# Patient Record
Sex: FEMALE | Race: WHITE | NOT HISPANIC OR LATINO | Employment: UNEMPLOYED | URBAN - METROPOLITAN AREA
[De-identification: names, ages, dates, MRNs, and addresses within clinical notes are randomized per-mention and may not be internally consistent; named-entity substitution may affect disease eponyms.]

---

## 2017-01-18 ENCOUNTER — OFFICE VISIT (OUTPATIENT)
Dept: FAMILY MEDICINE | Facility: CLINIC | Age: 27
End: 2017-01-18
Payer: COMMERCIAL

## 2017-01-18 VITALS
SYSTOLIC BLOOD PRESSURE: 122 MMHG | DIASTOLIC BLOOD PRESSURE: 78 MMHG | BODY MASS INDEX: 19.48 KG/M2 | HEART RATE: 80 BPM | WEIGHT: 124.4 LBS | TEMPERATURE: 97.9 F

## 2017-01-18 DIAGNOSIS — T36.95XA ANTIBIOTIC-INDUCED YEAST INFECTION: ICD-10-CM

## 2017-01-18 DIAGNOSIS — B96.89 BACTERIAL VAGINOSIS: Primary | ICD-10-CM

## 2017-01-18 DIAGNOSIS — R39.89 SENSATION OF PRESSURE IN BLADDER AREA: ICD-10-CM

## 2017-01-18 DIAGNOSIS — N76.0 BACTERIAL VAGINOSIS: Primary | ICD-10-CM

## 2017-01-18 DIAGNOSIS — Z11.3 SCREEN FOR STD (SEXUALLY TRANSMITTED DISEASE): ICD-10-CM

## 2017-01-18 DIAGNOSIS — B37.9 ANTIBIOTIC-INDUCED YEAST INFECTION: ICD-10-CM

## 2017-01-18 LAB
ALBUMIN UR-MCNC: NEGATIVE MG/DL
APPEARANCE UR: CLEAR
BETA HCG QUAL IFA URINE: NEGATIVE
BILIRUB UR QL STRIP: NEGATIVE
COLOR UR AUTO: YELLOW
GLUCOSE UR STRIP-MCNC: NEGATIVE MG/DL
HGB UR QL STRIP: NEGATIVE
KETONES UR STRIP-MCNC: NEGATIVE MG/DL
LEUKOCYTE ESTERASE UR QL STRIP: NEGATIVE
MICRO REPORT STATUS: ABNORMAL
NITRATE UR QL: NEGATIVE
PH UR STRIP: 6 PH (ref 5–7)
SP GR UR STRIP: 1.01 (ref 1–1.03)
SPECIMEN SOURCE: ABNORMAL
URN SPEC COLLECT METH UR: NORMAL
UROBILINOGEN UR STRIP-ACNC: 0.2 EU/DL (ref 0.2–1)
WET PREP SPEC: ABNORMAL

## 2017-01-18 PROCEDURE — 81003 URINALYSIS AUTO W/O SCOPE: CPT | Performed by: NURSE PRACTITIONER

## 2017-01-18 PROCEDURE — 87491 CHLMYD TRACH DNA AMP PROBE: CPT | Performed by: NURSE PRACTITIONER

## 2017-01-18 PROCEDURE — 87591 N.GONORRHOEAE DNA AMP PROB: CPT | Performed by: NURSE PRACTITIONER

## 2017-01-18 PROCEDURE — 36415 COLL VENOUS BLD VENIPUNCTURE: CPT | Performed by: NURSE PRACTITIONER

## 2017-01-18 PROCEDURE — 87389 HIV-1 AG W/HIV-1&-2 AB AG IA: CPT | Performed by: NURSE PRACTITIONER

## 2017-01-18 PROCEDURE — 84703 CHORIONIC GONADOTROPIN ASSAY: CPT | Performed by: NURSE PRACTITIONER

## 2017-01-18 PROCEDURE — 87210 SMEAR WET MOUNT SALINE/INK: CPT | Performed by: NURSE PRACTITIONER

## 2017-01-18 PROCEDURE — 99214 OFFICE O/P EST MOD 30 MIN: CPT | Performed by: NURSE PRACTITIONER

## 2017-01-18 RX ORDER — FLUCONAZOLE 150 MG/1
150 TABLET ORAL ONCE
Qty: 1 TABLET | Refills: 0 | Status: SHIPPED | OUTPATIENT
Start: 2017-01-18 | End: 2017-01-18

## 2017-01-18 RX ORDER — METRONIDAZOLE 500 MG/1
500 TABLET ORAL 2 TIMES DAILY
Qty: 14 TABLET | Refills: 0 | Status: SHIPPED | OUTPATIENT
Start: 2017-01-18 | End: 2018-03-01

## 2017-01-18 NOTE — PATIENT INSTRUCTIONS
Treat bacterial vaginosis with oral Flagyl    Will start Diflucan for preventive yeast infection  - call clinic if having yeast infection symptoms after 7 days and will put in another script    Urine looked good today and pregnancy negative    Continue the rest of your course of Valtrex    Other tests will take approximately 3-5 days to result and will notify you when all are back unless there is a positive     Start taking a probiotic daily     Return to clinic if symptoms not improved after 2 weeks     Preventing Vaginal Infection  These steps can help you stay comfortable during treatment of a vaginal infection. They also help prevent vaginal infections in the future.    Keeping a healthy balance  Factors that change the normal balance in the vagina can lead to a vaginal infection. To help keep the balance normal, try these tips:    Change out of wet bathing suits and damp exercise clothes as soon as possible. Yeast thrive in a warm, moist environment.    Avoid wearing tight pants. Choose cotton underwear and pantyhose that have a cotton crotch. Cotton keeps you cooler and drier than synthetics.    Don't douche unless directed by your health care provider. Douching can destroy friendly bacteria and change the vagina's normal balance.    Wipe from front to back after using the toilet. This prevents bacteria from spreading from the anus to the vulva.    Wash the vulva with mild, unscented soap or with plain water.    Wash your diaphragm, spermicide applicators, and sex toys with mild soap and water after use. Dry them thoroughly before putting them away.    Change tampons often (every 2 hours to 4 hours). Leaving a tampon in for too long may disrupt the balance of vaginal bacteria.  Staying healthy overall  Good overall health can help you resist infection. To be healthier:    Help protect yourself from STDs by using latex condoms for intercourse. Ask your health care provider for more information about safer  sex.    Eat a variety of healthy foods.    Exercise regularly.    Get enough rest and sleep.    Maintain a healthy weight. If you need to lose weight, ask your health care provider for advice on how to start.    8404-3374 [employer]. [address] [place] [address], [place] [address]. All rights reserved. This information is not intended as a substitute for professional medical care. Always follow your healthcare professional's instructions.

## 2017-01-18 NOTE — MR AVS SNAPSHOT
After Visit Summary   1/18/2017    Shruthi Vega    MRN: [id number]           Patient Information     Date Of Birth          1990        Visit Information        Provider Department      1/18/2017 10:00 AM Alka Poon APRN [facility]        Today's Diagnoses     Vaginal discharge    -  1     Sensation of pressure in bladder area         Bacterial vaginosis         Antibiotic-induced yeast infection         Screen for STD (sexually transmitted disease)           Care Instructions    Treat bacterial vaginosis with oral Flagyl    Will start Diflucan for preventive yeast infection  - call clinic if having yeast infection symptoms after 7 days and will put in another script    Urine looked good today and pregnancy negative    Continue the rest of your course of Valtrex    Other tests will take approximately 3-5 days to result and will notify you when all are back unless there is a positive     Start taking a probiotic daily     Return to clinic if symptoms not improved after 2 weeks     Preventing Vaginal Infection  These steps can help you stay comfortable during treatment of a vaginal infection. They also help prevent vaginal infections in the future.    Keeping a healthy balance  Factors that change the normal balance in the vagina can lead to a vaginal infection. To help keep the balance normal, try these tips:    Change out of wet bathing suits and damp exercise clothes as soon as possible. Yeast thrive in a warm, moist environment.    Avoid wearing tight pants. Choose cotton underwear and pantyhose that have a cotton crotch. Cotton keeps you cooler and drier than synthetics.    Don't douche unless directed by your health care provider. Douching can destroy friendly bacteria and change the vagina's normal balance.    Wipe from front to back after using the toilet. This prevents bacteria from spreading from the anus to the vulva.    Wash the vulva with mild,  unscented soap or with plain water.    Wash your diaphragm, spermicide applicators, and sex toys with mild soap and water after use. Dry them thoroughly before putting them away.    Change tampons often (every 2 hours to 4 hours). Leaving a tampon in for too long may disrupt the balance of vaginal bacteria.  Staying healthy overall  Good overall health can help you resist infection. To be healthier:    Help protect yourself from STDs by using latex condoms for intercourse. Ask your health care provider for more information about safer sex.    Eat a variety of healthy foods.    Exercise regularly.    Get enough rest and sleep.    Maintain a healthy weight. If you need to lose weight, ask your health care provider for advice on how to start.    1548-1868 [employer]. [address] [place] [address], [place] [address]. All rights reserved. This information is not intended as a substitute for professional medical care. Always follow your healthcare professional's instructions.              Follow-ups after your visit        Who to contact     If you have questions or need follow up information about today's clinic visit or your schedule please contact [facility] directly at [phone].  Normal or non-critical lab and imaging results will be communicated to you by MyChart, letter or phone within 4 business days after the clinic has received the results. If you do not hear from us within 7 days, please contact the clinic through [employer] or phone. If you have a critical or abnormal lab result, we will notify you by phone as soon as possible.  Submit refill requests through [employer] or call your pharmacy and they will forward the refill request to us. Please allow 3 business days for your refill to be completed.          Additional Information About Your Visit        [employer] Information     [employer] gives you secure access to your electronic health record. If you see a primary care provider, you can  also send messages to your care team and make appointments. If you have questions, please call your primary care clinic.  If you do not have a primary care provider, please call [phone] and they will assist you.        Care EveryWhere ID     This is your Care EveryWhere ID. This could be used by other organizations to access your [place] medical records  [id number]        Your Vitals Were     Pulse Temperature                80 97.9  F (36.6  C) (Tympanic)           Blood Pressure from Last 3 Encounters:   01/18/17 122/78   12/11/16 131/88   11/05/16 122/77    Weight from Last 3 Encounters:   01/18/17 124 lb 6.4 oz (56.427 kg)   12/11/16 121 lb 9.6 oz (55.157 kg)   09/23/16 121 lb 12.8 oz (55.248 kg)              We Performed the Following     *UA reflex to Microscopic and Culture ([facility] and [facility] (except [place] and [place])     Beta HCG qual IFA urine     Chlamydia trachomatis PCR     HIV Antigen Antibody Combo     Neisseria gonorrhoeae PCR     Wet prep          Today's Medication Changes          These changes are accurate as of: 1/18/17 10:50 AM.  If you have any questions, ask your nurse or doctor.               Start taking these medicines.        Dose/Directions    fluconazole 150 MG tablet   Commonly known as:  DIFLUCAN   Used for:  Antibiotic-induced yeast infection   Started by:  Alka Poon APRN CNP        Dose:  150 mg   Take 1 tablet (150 mg) by mouth once for 1 dose   Quantity:  1 tablet   Refills:  0       metroNIDAZOLE 500 MG tablet   Commonly known as:  FLAGYL   Used for:  Bacterial vaginosis   Started by:  Alka Poon APRN CNP        Dose:  500 mg   Take 1 tablet (500 mg) by mouth 2 times daily   Quantity:  14 tablet   Refills:  0         These medicines have changed or have updated prescriptions.        Dose/Directions    valACYclovir 1000 mg tablet   Commonly known as:  VALTREX   This may have changed:    - additional instructions  - Another  medication with the same name was removed. Continue taking this medication, and follow the directions you see here.   Used for:  Recurrent vaginitis, Herpes simplex type 1 infection   Changed by:  Juanis Bradford NP        Dose:  1000 mg   Take 1 tablet (1,000 mg) by mouth 2 times daily   Quantity:  20 tablet   Refills:  2            Where to get your medicines      These medications were sent to [facility] [address] [place] [address]  [address] [place] [address]     Phone:  [phone]    - fluconazole 150 MG tablet  - metroNIDAZOLE 500 MG tablet             Primary Care Provider Office Phone # Fax #    Juanis Bradford [phone]       [facility] [address] [facility] [address]        Thank you!     Thank you for choosing [facility]  for your care. Our goal is always to provide you with excellent care. Hearing back from our patients is one way we can continue to improve our services. Please take a few minutes to complete the written survey that you may receive in the mail after your visit with us. Thank you!             Your Updated Medication List - Protect others around you: Learn how to safely use, store and throw away your medicines at www.disposemymeds.org.          This list is accurate as of: 1/18/17 10:50 AM.  Always use your most recent med list.                   Brand Name Dispense Instructions for use    citalopram 20 MG tablet    celeXA    30 tablet    Take 1/2 tablets (10 mg) once daily for 1-2 weeks, then increase 1 tablet (20 mg) daily after that.       CLARITIN PO      PRN       fluconazole 150 MG tablet    DIFLUCAN    1 tablet    Take 1 tablet (150 mg) by mouth once for 1 dose       HYDROcodone-acetaminophen 5-325 MG per tablet    NORCO    8 tablet    Take 1 tablet by mouth every 6 hours as needed for moderate to severe pain maximum 6 tablet(s) per day       IBUPROFEN PO      Take 800  mg by mouth every 8 hours as needed for moderate pain       Lidocaine HCl 2 % Crea     1 Bottle    Externally apply topically 4 times daily as needed       metroNIDAZOLE 500 MG tablet    FLAGYL    14 tablet    Take 1 tablet (500 mg) by mouth 2 times daily       ONE-A-DAY WOMENS PO      Take 1 tablet by mouth every evening       valACYclovir 1000 mg tablet    VALTREX    20 tablet    Take 1 tablet (1,000 mg) by mouth 2 times daily

## 2017-01-18 NOTE — PROGRESS NOTES
SUBJECTIVE:                                                    Shruthi Vega is a 27 year old female who presents to clinic today for the following health issues:    Vaginal Symptoms/STD screening     Onset: 2 days    Description:  Vaginal Discharge: white, Milky    Itching (Pruritis): YES  Burning sensation:  YES  Odor: no     Accompanying Signs & Symptoms:  Pain with Urination: YES- slightly more internal   Abdominal Pain: no   Fever: no    History:   Sexually active: YES  New Partner: YES  Possibility of Pregnancy:  Yes    Precipitating factors:   Recent Antibiotic Use: YES- takes Valtrex daily     Alleviating factors:  None     Therapies Tried and outcome: Took Valtrex today has been on for a while, unsure when started, warm bathes         Problem list and histories reviewed & adjusted, as indicated.  Additional history: as documented    Patient Active Problem List   Diagnosis     CARDIOVASCULAR SCREENING; LDL GOAL LESS THAN 160     Recurrent vaginitis     Dayron     Female infertility     History of PID     Major depressive disorder, single episode, mild (H)     Herpes simplex type 1 infection     Anxiety     Alcohol abuse     Past Surgical History   Procedure Laterality Date     Mouth surgery       wisdom teeth       Social History   Substance Use Topics     Smoking status: Current Some Day Smoker -- 0.25 packs/day for 8 years     Last Attempt to Quit: 03/19/2014     Smokeless tobacco: Never Used     Alcohol Use: 0.0 oz/week     0 Standard drinks or equivalent per week      Comment: occ.     Family History   Problem Relation Age of Onset     Hypertension Father          Current Outpatient Prescriptions   Medication Sig Dispense Refill     metroNIDAZOLE (FLAGYL) 500 MG tablet Take 1 tablet (500 mg) by mouth 2 times daily 14 tablet 0     fluconazole (DIFLUCAN) 150 MG tablet Take 1 tablet (150 mg) by mouth once for 1 dose 1 tablet 0     HYDROcodone-acetaminophen (NORCO) 5-325 MG per tablet Take 1  tablet by mouth every 6 hours as needed for moderate to severe pain maximum 6 tablet(s) per day 8 tablet 0     valACYclovir (VALTREX) 1000 mg tablet Take 1 tablet (1,000 mg) by mouth 2 times daily (Patient taking differently: Take 1,000 mg by mouth 2 times daily Taking once daily.) 20 tablet 2     IBUPROFEN PO Take 800 mg by mouth every 8 hours as needed for moderate pain       Multiple Vitamins-Calcium (ONE-A-DAY WOMENS PO) Take 1 tablet by mouth every evening       Lidocaine HCl 2 % CREA Externally apply topically 4 times daily as needed 1 Bottle 0     Loratadine (CLARITIN PO) PRN       [DISCONTINUED] valACYclovir (VALTREX) 1000 mg tablet Take 1 tablet (1,000 mg) by mouth 3 times daily 21 tablet 0     citalopram (CELEXA) 20 MG tablet Take 1/2 tablets (10 mg) once daily for 1-2 weeks, then increase 1 tablet (20 mg) daily after that. 30 tablet 1     Allergies   Allergen Reactions     Amoxicillin Other (See Comments)     Flu-like symptoms     Doxycycline Other (See Comments)     Flu-like symptoms       ROS:  Constitutional, HEENT, cardiovascular, pulmonary, gi and gu systems are negative, except as otherwise noted.    OBJECTIVE:                                                    /78 mmHg  Pulse 80  Temp(Src) 97.9  F (36.6  C) (Tympanic)  Wt 124 lb 6.4 oz (56.427 kg)  Body mass index is 19.48 kg/(m^2).  GENERAL APPEARANCE: healthy, alert and no distress  RESP: lungs clear to auscultation - no rales, rhonchi or wheezes  CV: regular rates and rhythm, normal S1 S2, no S3 or S4 and no murmur, click or rub  ABDOMEN: soft, nontender, without hepatosplenomegaly or masses and bowel sounds normal   (female): unable to visualize cervix, adnexae, and uterus without masses, white milky discharge present, no lesions noted      Diagnostic Test Results:  Results for orders placed or performed in visit on 01/18/17 (from the past 24 hour(s))   Wet prep   Result Value Ref Range    Specimen Description Vagina     Wet Prep (A)       No yeast seen  Clue cells seen  No Trichomonas seen      Micro Report Status FINAL 01/18/2017    *UA reflex to Microscopic and Culture ([facility] and [facility] (except [place] and [place])   Result Value Ref Range    Color Urine Yellow     Appearance Urine Clear     Glucose Urine Negative NEG mg/dL    Bilirubin Urine Negative NEG    Ketones Urine Negative NEG mg/dL    Specific Gravity Urine 1.015 1.003 - 1.035    Blood Urine Negative NEG    pH Urine 6.0 5.0 - 7.0 pH    Protein Albumin Urine Negative NEG mg/dL    Urobilinogen Urine 0.2 0.2 - 1.0 EU/dL    Nitrite Urine Negative NEG    Leukocyte Esterase Urine Negative NEG    Source Midstream Urine    Beta HCG qual IFA urine   Result Value Ref Range    Beta HCG Qual IFA Urine Negative NEG        Chlamydia, Gonorrhea, HIV - pending   ASSESSMENT/PLAN:                                                      1. Bacterial vaginosis  Has had white, milky discharge with itching and burning. Has a history of bacterial vaginosis   - Wet prep - positive for clue cells   - metroNIDAZOLE (FLAGYL) 500 MG tablet; Take 1 tablet (500 mg) by mouth 2 times daily  Dispense: 14 tablet; Refill: 0    2. Antibiotic-induced yeast infection  Easily gets yeast infections with antibiotic usage - will empirically treat   - fluconazole (DIFLUCAN) 150 MG tablet; Take 1 tablet (150 mg) by mouth once for 1 dose  Dispense: 1 tablet; Refill: 0    3. Screen for STD (sexually transmitted disease)  Patient has new partner, would like to be screened for STDs including HIV, even though discussion did not elude to high risk   - Chlamydia trachomatis PCR  - Neisseria gonorrhoeae PCR  - HIV Antigen Antibody Combo    4. Sensation of pressure in bladder area  Has bladder pressure and discomfort in bladder when urinating   - *UA reflex to Microscopic and Culture ([facility] and [facility] (except [place] and [place]) - normal  - Beta HCG qual IFA urine - negative    See Patient  Instructions    ALBERT Bartholomew CNP  [facility]

## 2017-01-18 NOTE — NURSING NOTE
"Chief Complaint   Patient presents with     Vaginal Problem     STD     /78 mmHg  Pulse 80  Temp(Src) 97.9  F (36.6  C) (Tympanic)  Wt 124 lb 6.4 oz (56.427 kg) Estimated body mass index is 19.48 kg/(m^2) as calculated from the following:    Height as of 9/23/16: 5' 7\" (1.702 m).    Weight as of this encounter: 124 lb 6.4 oz (56.427 kg).  bp completed using cuff size: regular      Health Maintenance that is potentially due pending provider review:  NONE    N/a  Laurel Garcia M.A.      "

## 2017-01-19 LAB
C TRACH DNA SPEC QL NAA+PROBE: NORMAL
HIV 1+2 AB+HIV1 P24 AG SERPL QL IA: NORMAL
N GONORRHOEA DNA SPEC QL NAA+PROBE: NORMAL
SPECIMEN SOURCE: NORMAL
SPECIMEN SOURCE: NORMAL

## 2017-03-13 ENCOUNTER — TELEPHONE (OUTPATIENT)
Dept: FAMILY MEDICINE | Facility: CLINIC | Age: 27
End: 2017-03-13

## 2017-03-13 NOTE — TELEPHONE ENCOUNTER
Panel Management Review      Patient has the following on her problem list:     Depression / Dysthymia review  PHQ-9 SCORE 9/23/2016   Total Score 23      Patient is due for:  PHQ9      Composite cancer screening  Chart review shows that this patient is due/due soon for the following None  Summary:    Patient is due/failing the following:   PHQ9    Action needed:   Patient needs to do PHQ9.    Type of outreach:    Tried to call patient , Cell # is not talking calls. Will mail phq-9/gad7 for her to fill out and mail back .     Questions for provider review:    None                                                                                                                                    Aaron RIGGINS CMA

## 2017-03-13 NOTE — LETTER
[facility]  [address] [address]  Phone: [phone]    March 13, 2017    Shruthi PANIAGUA Gary  [address] [address]              Dear Ms. Vega,    We have been unable to reach you by phone Your [place] Care Team works hard to make sure that you and your  family receive exceptional care. Enclosed you will find a copy of the phq-9/gad7 depression form  that our clinic uses to monitor and manage your Depression/anxiety  This test is an assessment tool that we can use to determine how well you Depression  is controlled. Please fill out and mail back the enclosed  form. Enclosed is a stamped addressed envelope for you to mail the form  back to the clinic in.          Sincerely,      HILTON Crow / keagan

## 2017-03-13 NOTE — TELEPHONE ENCOUNTER
Index Date:9/23/16   PHQ9 Due by:4/23/17    Please contact patient to complete follow up PHQ9 before their DUE DATE.     This is important feedback for your care team to monitor how you are doing while taking Celexa.     You completed this same questionnaire at your last visit on 9/23/16.     PHQ-9 SCORE 9/23/2016   Total Score 23       MA STAFF: If upon calling patient and PHQ9 score is higher that 10 route to the provider. You may also seek an RN for review.

## 2017-04-20 ENCOUNTER — TELEPHONE (OUTPATIENT)
Dept: FAMILY MEDICINE | Facility: CLINIC | Age: 27
End: 2017-04-20

## 2017-04-20 NOTE — TELEPHONE ENCOUNTER
PHQ9 Due by:4/23/17    Please contact patient to complete follow up PHQ9 before their DUE DATE.     This is important feedback for your care team to monitor how you are doing while taking Celexa.     You completed this same questionnaire   PHQ-9 SCORE 9/23/2016   Total Score 23       MA STAFF: If upon calling patient and PHQ9 score is higher that 10 route to the provider. You may also seek an RN for review.

## 2017-04-20 NOTE — LETTER
[facility]  [address] [address]  Phone: [phone]    May 2, 2017    Shruthi PANIAGUA Gary  [address] [address]              Dear Ms. Vega,    We have been unable to reach you by phone Your [place] Care Team works hard to make sure that you and your  family receive exceptional care. Enclosed you will find a copy of the phq-9/gad7 depression form  that our clinic uses to monitor and manage your Depression/anxiety  This test is an assessment tool that we can use to determine how well you Depression  is controlled. Please fill out and mail back the enclosed  form. Enclosed is a stamped addressed envelope for you to mail the form  back to the clinic in.      Sincerely,      HILTON Crow / keagan

## 2017-04-26 ENCOUNTER — TELEPHONE (OUTPATIENT)
Dept: FAMILY MEDICINE | Facility: CLINIC | Age: 27
End: 2017-04-26

## 2017-04-26 NOTE — TELEPHONE ENCOUNTER
Patient returned call, wondering why we were calling. I told her it was for her PHQ9 questionere, she stated she would complete that in her my chart.  Sharla Hadley  [facility] [place] [facility]

## 2017-04-27 NOTE — TELEPHONE ENCOUNTER
Left message for the patient to call the clinic. PHQ-9 sent by my chart to be completed. Arianne ALONSO RN

## 2017-04-28 NOTE — TELEPHONE ENCOUNTER
Message left for pt to return call to clinic. Please inform her of below information  Aaliyah Skinner RN

## 2017-05-02 NOTE — TELEPHONE ENCOUNTER
Panel Management Review      Patient has the following on her problem list:     Depression / Dysthymia review  PHQ-9 SCORE 9/23/2016   Total Score 23      Patient is due for:  PHQ9      Composite cancer screening  Chart review shows that this patient is due/due soon for the following None  Summary:    Patient is due/failing the following:   PHQ9    Action needed:   Patient needs to do PHQ9.    Type of outreach:    Sent letter. with phq-9/gad7 for patient to fill out and mail back     Questions for provider review:    None                                                                                                                                    Aaron RIGGINS CMA

## 2017-05-02 NOTE — TELEPHONE ENCOUNTER
Left message for patient to return call to clinic.  Patient has not completed her PHQ9 via [employer].    Yasmin Ortiz RN

## 2017-05-04 NOTE — TELEPHONE ENCOUNTER
Unable to contact the patient by phone or my chart to update her PHQ-9 and DAWN-7, mailed to her for completion. Arianne ALONSO RN

## 2017-12-19 ENCOUNTER — E-VISIT (OUTPATIENT)
Dept: FAMILY MEDICINE | Facility: CLINIC | Age: 27
End: 2017-12-19
Payer: COMMERCIAL

## 2017-12-19 DIAGNOSIS — N76.0 RECURRENT VAGINITIS: ICD-10-CM

## 2017-12-19 DIAGNOSIS — B00.9 HERPES SIMPLEX TYPE 1 INFECTION: ICD-10-CM

## 2017-12-19 DIAGNOSIS — B00.9 HERPES SIMPLEX TYPE 1 INFECTION: Primary | ICD-10-CM

## 2017-12-19 PROCEDURE — 99444 ZZC PHYSICIAN ONLINE EVALUATION & MANAGEMENT SERVICE: CPT | Performed by: NURSE PRACTITIONER

## 2017-12-19 RX ORDER — VALACYCLOVIR HYDROCHLORIDE 500 MG/1
500 TABLET, FILM COATED ORAL 2 TIMES DAILY
Qty: 6 TABLET | Refills: 3 | Status: SHIPPED | OUTPATIENT
Start: 2017-12-19 | End: 2018-03-01

## 2017-12-19 NOTE — MR AVS SNAPSHOT
After Visit Summary   12/19/2017    Shruthi Vega    MRN: [id number]           Patient Information     Date Of Birth          1990        Visit Information        Provider Department      12/19/2017 2:47 PM Juanis Bradford NP [facility]        Today's Diagnoses     Herpes simplex type 1 infection    -  1      Care Instructions                    Genital Herpes  What is genital herpes?   Genital herpes is a common infection caused by a virus. The virus is called the herpes simplex virus, or HSV. It causes painful blisters that break open and form sores in the genital area.   There are 2 types of HSV, type 1 and type 2. HSV-1 usually infects the lips and mouth. HSV-2 usually infects the genital area. However, you can have infections by either virus in any of these places.   How does it occur?   You can become infected with the virus by contact with broken blisters or sores on the genitals, mouth, or rectal area of an infected person. The infection can be passed from person to person during sex. You may spread it from one part of your body to another if the virus gets on your hands.   Once you are infected, the virus stays in your body for the rest of your life. Most of the time the virus is inactive, which means it is staying in certain cells in the body and not causing symptoms. However, the virus may become active and cause sores again. The sores may come back often. Outbreaks of sores may occur with physical stress, for example, if you wear tight clothing, have sex without enough lubrication, or have another illness. Emotional stress or menstruation may also cause an outbreak. Most people with herpes have recurrent infections.   Herpes is very contagious when you have sores. The virus may also spread to others even if you have no symptoms, or for up to 3 months after the sores have healed.   What are the symptoms?   Symptoms usually occur within 2 weeks after the virus  first enters your body. They may include:   painful sores (blisters) on the genitals (for example, on a man's penis or the area around a woman's vagina), thighs, or buttocks   vaginal discharge   pain when you urinate or have sex   trouble urinating   itching in the genital or anal area that starts suddenly   general discomfort, such as tiredness and muscle aches   fever (usually only with the first outbreak of blisters)   tender, enlarged lymph nodes in the groin   The sores appear first as tiny clear blisters. Usually they occur in groups of several blisters, but sometimes there may be just a single blister. The blisters usually quickly lose their thin tops. Then they look like small (1/8 inch to 1/4 inch wide), pink or red shallow sores. The blisters may be painful and oozing. They may become covered with a yellowish dried crust.   The symptoms of herpes are usually most severe during the first outbreak.   Some people infected with herpes have no symptoms.   How is it diagnosed?   Your healthcare provider will ask about your symptoms and examine you. Cells or liquid from one of the sores will be tested in the lab for the virus. Blood tests may be done to see if you have had a previous herpes infection.   How is it treated?   Genital herpes cannot be cured. The virus will stay in your body. However, your healthcare provider may prescribe antiviral medicine such as acyclovir, famciclovir, or valacyclovir to relieve your symptoms more quickly. Even though you are taking the medicine, the infection will still be very contagious as long as you have sores, but the medicine will shorten the amount of time you are contagious. If you are pregnant, discuss the use of these medicines with your provider.   Pain medicine such as acetaminophen or ibuprofen can help relieve pain and fever. Sitting in a bathtub of warm water 2 or 3 times a day may also help soothe the pain.   Herpes during pregnancy:  A herpes infection for the  first time during the first 3 months of pregnancy might cause a miscarriage or problems with the baby. If you get a herpes infection for the first time in the second trimester of pregnancy, it might cause premature labor and delivery. If you have an active herpes infection when you deliver your baby, you could pass the disease to the baby.   If you are pregnant and have had herpes, tell your provider so steps can be taken to avoid infecting the baby. Antiviral medicine is a safe medical treatment for infected pregnant women. It can help prevent an active infection that could be passed to your child during birth.   Antiviral medicine does not decrease the risk of passing the infection to the baby if you have sores at the time of delivery. If you have an active herpes infection when you go into labor, your provider may suggest a  delivery () to avoid infecting the baby.   Breast-feeding is safe as long as there are no sores on or around the breast.   How long will the effects last?   The sores usually start to heal after about 5 days. They generally disappear in 1 to 3 weeks. Sometimes they may last for as long as 6 weeks, especially if you also have a bacterial or yeast infection of the vagina.   The sores rarely leave scars.   About half of herpes-infected people have repeat outbreaks of sores. These recurrences tend to be milder than the first bout of herpes and the sores heal more quickly.   How can I take care of myself while I have an active infection?   Follow the full treatment prescribed by your healthcare provider. Be sure to take all of the medicine as prescribed by your healthcare provider.   Wipe yourself from front to back after using the toilet.   Wear loose clothing, preferably cotton, to allow circulation of air. It also helps avoid pressure on the skin. (Pressure can cause more blisters.)   Take aspirin, acetaminophen, or ibuprofen to reduce pain or fever.   Avoid sharing towels or  clothing.   Avoid using douches, perfumed soaps, sprays, feminine hygiene deodorants, or other chemicals in the genital area.   Avoid a lot of sunlight and heat, which may cause more blisters.   Avoid sexual contact with others.   There are many herpes counseling groups that give support and help to people who have herpes. You can get more information by calling the National Sexually Transmitted Disease Hotline at 1-786.332.9424.   What can I do to help prevent recurrences of herpes infection?   You may have fewer recurrences if:   You take antiviral medicine as prescribed by your healthcare provider. Daily doses of acyclovir or another antiviral medicine may lessen the frequency of recurrent outbreaks of herpes sores and might prevent recurrences completely.   You follow your healthcare provider's instructions for follow-up visits and tests.   You tell your sexual partner or partners about the infection so they can be checked and treated, if necessary.   You avoid conditions that might cause the infection to recur, such as severe stress or vaginal infections.   How can I help prevent infection with genital herpes?   Practice safe sex. Always use latex or polyurethane condoms during any sexual contact. You cannot always know or predict when the virus will be shed or passed to someone else. This includes oral-genital and anal-genital sex. In addition, you are less likely to get a sexually transmitted disease if you have just one sexual partner who has no other partners.   Ask your partner(s) if they have had herpes because herpes may be spread from areas not protected by a condom, such as the groin, thigh, and rectum. Avoid sexual contact if your partner has any sores.   Avoid oral-genital and oral-anal sex with someone who has fever blisters (cold sores) in the mouth. Cold sores are caused by a related virus that can infect the genitals.                   Follow-ups after your visit        Who to contact     If you  have questions or need follow up information about today's clinic visit or your schedule please contact [facility] directly at [phone].  Normal or non-critical lab and imaging results will be communicated to you by MyChart, letter or phone within 4 business days after the clinic has received the results. If you do not hear from us within 7 days, please contact the clinic through amaysimhart or phone. If you have a critical or abnormal lab result, we will notify you by phone as soon as possible.  Submit refill requests through [employer] or call your pharmacy and they will forward the refill request to us. Please allow 3 business days for your refill to be completed.          Additional Information About Your Visit        [employer] Information     [employer] gives you secure access to your electronic health record. If you see a primary care provider, you can also send messages to your care team and make appointments. If you have questions, please call your primary care clinic.  If you do not have a primary care provider, please call [phone] and they will assist you.        Care EveryWhere ID     This is your Care EveryWhere ID. This could be used by other organizations to access your [facility] medical records  [id number]         Blood Pressure from Last 3 Encounters:   01/18/17 122/78   12/11/16 131/88   11/05/16 122/77    Weight from Last 3 Encounters:   01/18/17 124 lb 6.4 oz (56.4 kg)   12/11/16 121 lb 9.6 oz (55.2 kg)   09/23/16 121 lb 12.8 oz (55.2 kg)              Today, you had the following     No orders found for display         Today's Medication Changes          These changes are accurate as of: 12/19/17  2:54 PM.  If you have any questions, ask your nurse or doctor.               These medicines have changed or have updated prescriptions.        Dose/Directions    * valACYclovir 1000 mg tablet   Commonly known as:  VALTREX   This may have changed:  additional instructions   Used for:   Recurrent vaginitis, Herpes simplex type 1 infection   Changed by:  Juanis Bradford NP        Dose:  1000 mg   Take 1 tablet (1,000 mg) by mouth 2 times daily   Quantity:  20 tablet   Refills:  2       * valACYclovir 500 MG tablet   Commonly known as:  VALTREX   This may have changed:  You were already taking a medication with the same name, and this prescription was added. Make sure you understand how and when to take each.   Used for:  Herpes simplex type 1 infection   Changed by:  Juanis Bradford NP        Dose:  500 mg   Take 1 tablet (500 mg) by mouth 2 times daily   Quantity:  6 tablet   Refills:  3       * Notice:  This list has 2 medication(s) that are the same as other medications prescribed for you. Read the directions carefully, and ask your doctor or other care provider to review them with you.         Where to get your medicines      These medications were sent to [facility] [address] [address]     Phone:  [phone]     valACYclovir 500 MG tablet                Primary Care Provider Office Phone # Fax #    Juanis Bradford [phone] [address] [facility] [address]        Equal Access to Services     SHAHEEN JOHNSTON : Sana muñozo [place], waaxda luqadaha, qaybta kaalmada adeegyada, dilma dodge. So [facility] [phone].    ATENCIÓN: Si habla español, tiene a tipton disposición servicios gratuitos de asistencia lingüística. Eros al [phone].    We comply with applicable federal civil rights laws and [place] laws. We do not discriminate on the basis of race, color, national origin, age, disability, sex, sexual orientation, or gender identity.            Thank you!     Thank you for choosing [facility]  for your care. Our goal is always to provide you with excellent care. Hearing back from our patients is one way we can  continue to improve our services. Please take a few minutes to complete the written survey that you may receive in the mail after your visit with us. Thank you!             Your Updated Medication List - Protect others around you: Learn how to safely use, store and throw away your medicines at www.disposemymeds.org.          This list is accurate as of: 12/19/17  2:54 PM.  Always use your most recent med list.                   Brand Name Dispense Instructions for use Diagnosis    citalopram 20 MG tablet    celeXA    30 tablet    Take 1/2 tablets (10 mg) once daily for 1-2 weeks, then increase 1 tablet (20 mg) daily after that.    Major depressive disorder, single episode, mild (H)       CLARITIN PO      PRN        HYDROcodone-acetaminophen 5-325 MG per tablet    NORCO    8 tablet    Take 1 tablet by mouth every 6 hours as needed for moderate to severe pain maximum 6 tablet(s) per day    HSV (herpes simplex virus) infection       IBUPROFEN PO      Take 800 mg by mouth every 8 hours as needed for moderate pain        Lidocaine HCl 2 % Crea     1 Bottle    Externally apply topically 4 times daily as needed    Genital herpes simplex, unspecified genital herpes simplex infection       metroNIDAZOLE 500 MG tablet    FLAGYL    14 tablet    Take 1 tablet (500 mg) by mouth 2 times daily    Bacterial vaginosis       ONE-A-DAY WOMENS PO      Take 1 tablet by mouth every evening        * valACYclovir 1000 mg tablet    VALTREX    20 tablet    Take 1 tablet (1,000 mg) by mouth 2 times daily    Recurrent vaginitis, Herpes simplex type 1 infection       * valACYclovir 500 MG tablet    VALTREX    6 tablet    Take 1 tablet (500 mg) by mouth 2 times daily    Herpes simplex type 1 infection       * Notice:  This list has 2 medication(s) that are the same as other medications prescribed for you. Read the directions carefully, and ask your doctor or other care provider to review them with you.

## 2017-12-19 NOTE — TELEPHONE ENCOUNTER
patient called requesting a refill from GAVIN Bradford for Valtrex.     Valtrex       Last Written Prescription Date: 11/02/2016  Last Fill Quantity: 20,  # refills: 2   Last Office Visit with [facility], [facility] or [facility] prescribing provider: 01/18/2017      Yasmine REYNA  [facility]

## 2017-12-19 NOTE — PATIENT INSTRUCTIONS
Genital Herpes  What is genital herpes?   Genital herpes is a common infection caused by a virus. The virus is called the herpes simplex virus, or HSV. It causes painful blisters that break open and form sores in the genital area.   There are 2 types of HSV, type 1 and type 2. HSV-1 usually infects the lips and mouth. HSV-2 usually infects the genital area. However, you can have infections by either virus in any of these places.   How does it occur?   You can become infected with the virus by contact with broken blisters or sores on the genitals, mouth, or rectal area of an infected person. The infection can be passed from person to person during sex. You may spread it from one part of your body to another if the virus gets on your hands.   Once you are infected, the virus stays in your body for the rest of your life. Most of the time the virus is inactive, which means it is staying in certain cells in the body and not causing symptoms. However, the virus may become active and cause sores again. The sores may come back often. Outbreaks of sores may occur with physical stress, for example, if you wear tight clothing, have sex without enough lubrication, or have another illness. Emotional stress or menstruation may also cause an outbreak. Most people with herpes have recurrent infections.   Herpes is very contagious when you have sores. The virus may also spread to others even if you have no symptoms, or for up to 3 months after the sores have healed.   What are the symptoms?   Symptoms usually occur within 2 weeks after the virus first enters your body. They may include:   painful sores (blisters) on the genitals (for example, on a man's penis or the area around a woman's vagina), thighs, or buttocks   vaginal discharge   pain when you urinate or have sex   trouble urinating   itching in the genital or anal area that starts suddenly   general discomfort, such as tiredness and muscle aches   fever  (usually only with the first outbreak of blisters)   tender, enlarged lymph nodes in the groin   The sores appear first as tiny clear blisters. Usually they occur in groups of several blisters, but sometimes there may be just a single blister. The blisters usually quickly lose their thin tops. Then they look like small (1/8 inch to 1/4 inch wide), pink or red shallow sores. The blisters may be painful and oozing. They may become covered with a yellowish dried crust.   The symptoms of herpes are usually most severe during the first outbreak.   Some people infected with herpes have no symptoms.   How is it diagnosed?   Your healthcare provider will ask about your symptoms and examine you. Cells or liquid from one of the sores will be tested in the lab for the virus. Blood tests may be done to see if you have had a previous herpes infection.   How is it treated?   Genital herpes cannot be cured. The virus will stay in your body. However, your healthcare provider may prescribe antiviral medicine such as acyclovir, famciclovir, or valacyclovir to relieve your symptoms more quickly. Even though you are taking the medicine, the infection will still be very contagious as long as you have sores, but the medicine will shorten the amount of time you are contagious. If you are pregnant, discuss the use of these medicines with your provider.   Pain medicine such as acetaminophen or ibuprofen can help relieve pain and fever. Sitting in a bathtub of warm water 2 or 3 times a day may also help soothe the pain.   Herpes during pregnancy:  A herpes infection for the first time during the first 3 months of pregnancy might cause a miscarriage or problems with the baby. If you get a herpes infection for the first time in the second trimester of pregnancy, it might cause premature labor and delivery. If you have an active herpes infection when you deliver your baby, you could pass the disease to the baby.   If you are pregnant and have  had herpes, tell your provider so steps can be taken to avoid infecting the baby. Antiviral medicine is a safe medical treatment for infected pregnant women. It can help prevent an active infection that could be passed to your child during birth.   Antiviral medicine does not decrease the risk of passing the infection to the baby if you have sores at the time of delivery. If you have an active herpes infection when you go into labor, your provider may suggest a  delivery () to avoid infecting the baby.   Breast-feeding is safe as long as there are no sores on or around the breast.   How long will the effects last?   The sores usually start to heal after about 5 days. They generally disappear in 1 to 3 weeks. Sometimes they may last for as long as 6 weeks, especially if you also have a bacterial or yeast infection of the vagina.   The sores rarely leave scars.   About half of herpes-infected people have repeat outbreaks of sores. These recurrences tend to be milder than the first bout of herpes and the sores heal more quickly.   How can I take care of myself while I have an active infection?   Follow the full treatment prescribed by your healthcare provider. Be sure to take all of the medicine as prescribed by your healthcare provider.   Wipe yourself from front to back after using the toilet.   Wear loose clothing, preferably cotton, to allow circulation of air. It also helps avoid pressure on the skin. (Pressure can cause more blisters.)   Take aspirin, acetaminophen, or ibuprofen to reduce pain or fever.   Avoid sharing towels or clothing.   Avoid using douches, perfumed soaps, sprays, feminine hygiene deodorants, or other chemicals in the genital area.   Avoid a lot of sunlight and heat, which may cause more blisters.   Avoid sexual contact with others.   There are many herpes counseling groups that give support and help to people who have herpes. You can get more information by calling the  National Sexually Transmitted Disease Hotline at 1-733.127.4408.   What can I do to help prevent recurrences of herpes infection?   You may have fewer recurrences if:   You take antiviral medicine as prescribed by your healthcare provider. Daily doses of acyclovir or another antiviral medicine may lessen the frequency of recurrent outbreaks of herpes sores and might prevent recurrences completely.   You follow your healthcare provider's instructions for follow-up visits and tests.   You tell your sexual partner or partners about the infection so they can be checked and treated, if necessary.   You avoid conditions that might cause the infection to recur, such as severe stress or vaginal infections.   How can I help prevent infection with genital herpes?   Practice safe sex. Always use latex or polyurethane condoms during any sexual contact. You cannot always know or predict when the virus will be shed or passed to someone else. This includes oral-genital and anal-genital sex. In addition, you are less likely to get a sexually transmitted disease if you have just one sexual partner who has no other partners.   Ask your partner(s) if they have had herpes because herpes may be spread from areas not protected by a condom, such as the groin, thigh, and rectum. Avoid sexual contact if your partner has any sores.   Avoid oral-genital and oral-anal sex with someone who has fever blisters (cold sores) in the mouth. Cold sores are caused by a related virus that can infect the genitals.

## 2017-12-20 RX ORDER — VALACYCLOVIR HYDROCHLORIDE 1 G/1
TABLET, FILM COATED ORAL
Qty: 20 TABLET | Refills: 1 | OUTPATIENT
Start: 2017-12-20

## 2018-03-01 ENCOUNTER — OFFICE VISIT (OUTPATIENT)
Dept: FAMILY MEDICINE | Facility: CLINIC | Age: 28
End: 2018-03-01
Payer: COMMERCIAL

## 2018-03-01 VITALS
BODY MASS INDEX: 19.15 KG/M2 | SYSTOLIC BLOOD PRESSURE: 132 MMHG | WEIGHT: 122 LBS | HEIGHT: 67 IN | DIASTOLIC BLOOD PRESSURE: 78 MMHG | HEART RATE: 72 BPM | TEMPERATURE: 98.2 F

## 2018-03-01 DIAGNOSIS — F41.8 DEPRESSION WITH ANXIETY: ICD-10-CM

## 2018-03-01 DIAGNOSIS — F19.20 DRUG DEPENDENCE (H): ICD-10-CM

## 2018-03-01 DIAGNOSIS — Z11.3 SCREEN FOR STD (SEXUALLY TRANSMITTED DISEASE): Primary | ICD-10-CM

## 2018-03-01 PROCEDURE — 87491 CHLMYD TRACH DNA AMP PROBE: CPT | Performed by: NURSE PRACTITIONER

## 2018-03-01 PROCEDURE — G0499 HEPB SCREEN HIGH RISK INDIV: HCPCS | Performed by: NURSE PRACTITIONER

## 2018-03-01 PROCEDURE — 36415 COLL VENOUS BLD VENIPUNCTURE: CPT | Performed by: NURSE PRACTITIONER

## 2018-03-01 PROCEDURE — 87389 HIV-1 AG W/HIV-1&-2 AB AG IA: CPT | Performed by: NURSE PRACTITIONER

## 2018-03-01 PROCEDURE — 87591 N.GONORRHOEAE DNA AMP PROB: CPT | Performed by: NURSE PRACTITIONER

## 2018-03-01 PROCEDURE — 86803 HEPATITIS C AB TEST: CPT | Performed by: NURSE PRACTITIONER

## 2018-03-01 PROCEDURE — 99214 OFFICE O/P EST MOD 30 MIN: CPT | Performed by: NURSE PRACTITIONER

## 2018-03-01 ASSESSMENT — ANXIETY QUESTIONNAIRES
GAD7 TOTAL SCORE: 20
7. FEELING AFRAID AS IF SOMETHING AWFUL MIGHT HAPPEN: MORE THAN HALF THE DAYS
5. BEING SO RESTLESS THAT IT IS HARD TO SIT STILL: NEARLY EVERY DAY
2. NOT BEING ABLE TO STOP OR CONTROL WORRYING: NEARLY EVERY DAY
1. FEELING NERVOUS, ANXIOUS, OR ON EDGE: NEARLY EVERY DAY
7. FEELING AFRAID AS IF SOMETHING AWFUL MIGHT HAPPEN: MORE THAN HALF THE DAYS
6. BECOMING EASILY ANNOYED OR IRRITABLE: NEARLY EVERY DAY
4. TROUBLE RELAXING: NEARLY EVERY DAY
3. WORRYING TOO MUCH ABOUT DIFFERENT THINGS: NEARLY EVERY DAY

## 2018-03-01 ASSESSMENT — PATIENT HEALTH QUESTIONNAIRE - PHQ9: SUM OF ALL RESPONSES TO PHQ QUESTIONS 1-9: 27

## 2018-03-01 ASSESSMENT — PAIN SCALES - GENERAL: PAINLEVEL: NO PAIN (0)

## 2018-03-01 NOTE — NURSING NOTE
"Chief Complaint   Patient presents with     STD     LAB REQUEST       Initial /78 (BP Location: Right arm, Cuff Size: Adult Regular)  Pulse 72  Temp 98.2  F (36.8  C) (Tympanic)  Ht 5' 7\" (1.702 m)  Wt 122 lb (55.3 kg)  LMP 02/28/2018  BMI 19.11 kg/m2 Estimated body mass index is 19.11 kg/(m^2) as calculated from the following:    Height as of this encounter: 5' 7\" (1.702 m).    Weight as of this encounter: 122 lb (55.3 kg).      Health Maintenance that is potentially due pending provider review:  NONE    Is there anyone who you would like to be able to receive your results? No  If yes have patient fill out [facility]    "

## 2018-03-01 NOTE — PATIENT INSTRUCTIONS
Treating Anxiety Disorders with Medicine  An anxiety disorder can make you feel nervous or apprehensive, even without a clear reason. In people age 65 and older, generalized anxiety disorder is one of the most commonly diagnosed anxiety disorders. Many times it occurs with depression. Certain anxiety disorders can cause intense feelings of fear or panic. You may even have physical symptoms such as a racing heartbeat, sweating, or dizziness. If you have these feelings, you don t have to suffer anymore. Treatment to help you overcome your fears will likely include therapy (also called counseling). Medicine may also be prescribed to help control your symptoms.    Medicines  Certain medicines may be prescribed to help control your symptoms. So you may feel less anxious. You may also feel able to move forward with therapy. At first, medicines and dosages may need to be adjusted to find what works best for you. Try to be patient. Tell your healthcare provider how a medicine makes you feel. This way, you can work together to find the treatment that s best for you. Keep in mind that medicines can have side effects. Talk with your provider about any side effects that are bothering you. Changing the dose or type of medicine may help. Don t stop taking medicine on your own. That can cause symptoms to come back.    Anti-anxiety medicine. This medicine eases symptoms and helps you relax. Your healthcare provider will explain when and how to use it. It may be prescribed for use before situations that make you anxious. You may also be told to take medicine on a regular schedule. Anti-anxiety medicine may make you feel a little sleepy or  out of it.  Don t drive a car or operate machinery while on this medicine, until you know how it affects you.  Caution  Never use alcohol or other drugs with anti-anxiety medicines. This could result in loss of muscular control, sedation, coma, or death. Also, use only the amount of medicine  prescribed for you. If you think you may have taken too much, get emergency care right away.     Antidepressant medicine. This kind of medicine is often used to treat anxiety, even if you aren t depressed. An antidepressant helps balance out brain chemicals. This helps keep anxiety under control. This medicine is taken on a schedule. It takes a few weeks to start working. If you don t notice a change at first, you may just need more time. But if you don t notice results after the first few weeks, tell your provider.  Keep taking medicines as prescribed  Never change your dosage, share or use another person's medicine, or stop taking your medicines without talking to your healthcare provider first. Keep the following in mind:    Some medicines must be taken on a schedule. Make this part of your daily routine. For instance, always take your pill before brushing your teeth. A pillbox can help you remember if you ve taken your medicine each day.    Medicines are often taken for 6 to 12 months. Your healthcare provider will then evaluate whether you need to stay on them. Many people who have also had therapy may no longer need medicine to manage anxiety.    You may need to stop taking medicine slowly to give your body time to adjust. When it s time to stop, your healthcare provider will tell you more. Remember: Never stop taking your medicine without talking to your provider first.    If symptoms return, you may need to start taking medicines again. This isn t your fault. It s just the nature of your anxiety disorder.  Special concerns    Side effects. Medicines may cause side effects. Ask your healthcare provider or pharmacist what you can expect. They may have ideas for avoiding some side effects.    Sexual problems. Some antidepressants can affect your desire for sex or your ability to have an orgasm. A change in dosage or medicine often solves the problem. If you have a sexual side effect that concerns you, tell your  healthcare provider.    Addiction. If you ve never had a problem with drugs or alcohol, you may not have a problem with medicines used to treat anxiety disorders. But always discuss the medicines with your healthcare provider before taking them. If you have a history of addiction, you may not be able to use certain medicines used to treat anxiety disorders.    Medicine interactions. Always check with your pharmacist before using any over-the-counter medicines, including herbal supplements.   Date Last Reviewed: 5/1/2017 2000-2017 [employer] [address] [place] [address]. All rights reserved. This information is not intended as a substitute for professional medical care. Always follow your healthcare professional's instructions.        Anxiety Reaction  Anxiety is the feeling we all get when we think something bad might happen. It is a normal response to stress and usually causes only a mild reaction. When anxiety becomes more severe, it can interfere with daily life. In some cases, you may not even be aware of what it is you re anxious about. There may also be a genetic link or it may be a learned behavior in the home.  Both psychological and physical triggers cause stress reaction. It's often a response to fear or emotional stress, real or imagined. This stress may come from home, family, work, or social relationships.  During an anxiety reaction, you may feel:    Helpless    Nervous    Depressed    Irritable  Your body may show signs of anxiety in many ways. You may experience:    Dry mouth    Shakiness    Dizziness    Weakness    Trouble breathing    Breathing fast (hyperventilating)    Chest pressure    Sweating    Headache    Nausea    Diarrhea    Tiredness    Inability to sleep    Sexual problems  Home care    Try to locate the sources of stress in your life. They may not be obvious. These may include:    Daily hassles of life (traffic jams, missed appointments, car troubles,  etc.)    Major life changes, both good (new baby, job promotion) and bad (loss of job, loss of loved one)    Overload: feeling that you have too many responsibilities and can't take care of all of them at once    Feeling helpless, feeling that your problems are beyond what you re able to solve    Notice how your body reacts to stress. Learn to listen to your body signals. This will help you take action before the stress becomes severe.    When you can, do something about the source of your stress. (Avoid hassles, limit the amount of change that happens in your life at one time and take a break when you feel overloaded).    Unfortunately, many stressful situations can't be avoided. It is necessary to learn how to better manage stress. There are many proven methods that will reduce your anxiety. These include simple things like exercise, good nutrition and adequate rest. Also, there are certain techniques that are helpful:    Relaxation    Breathing exercises    Visualization    Biofeedback    Meditation  For more information about this, consult your doctor or go to a local bookstore and review the many books and tapes available on this subject.  Follow-up care  If you feel that your anxiety is not responding to self-help measures, contact your doctor or make an appointment with a counselor. You may need short-term psychological counseling and temporary medicine to help you manage stress.  Call 911  Call your healthcare provider right away if any of these occur:    Trouble breathing    Confusion    Drowsiness or trouble wakening    Fainting or loss of consciousness    Rapid heart rate    Seizure    New chest pain that becomes more severe, lasts longer, or spreads into your shoulder, arm, neck, jaw, or back  When to seek medical advice  Call your healthcare provider right away if any of these occur:    Your symptoms get worse    Severe headache not relieved by rest and mild pain reliever  Date Last Reviewed:  9/29/2015 2000-2017 [employer]. [address] [place] [address], [place] [address]. All rights reserved. This information is not intended as a substitute for professional medical care. Always follow your healthcare professional's instructions.

## 2018-03-01 NOTE — LETTER
My Depression Action Plan  Name: Shruthi Vega   Date of Birth 1990  Date: 3/1/2018    My doctor: Juanis Bradford   My clinic: [address] [address] [phone]          GREEN    ZONE   Good Control    What it looks like:     Things are going generally well. You have normal up s and down s. You may even feel depressed from time to time, but bad moods usually last less than a day.   What you need to do:  1. Continue to care for yourself (see self care plan)  2. Check your depression survival kit and update it as needed  3. Follow your physician s recommendations including any medication.  4. Do not stop taking medication unless you consult with your physician first.           YELLOW         ZONE Getting Worse    What it looks like:     Depression is starting to interfere with your life.     It may be hard to get out of bed; you may be starting to isolate yourself from others.    Symptoms of depression are starting to last most all day and this has happened for several days.     You may have suicidal thoughts but they are not constant.   What you need to do:     1. Call your care team, your response to treatment will improve if you keep your care team informed of your progress. Yellow periods are signs an adjustment may need to be made.     2. Continue your self-care, even if you have to fake it!    3. Talk to someone in your support network    4. Open up your depression survival kit           RED    ZONE Medical Alert - Get Help    What it looks like:     Depression is seriously interfering with your life.     You may experience these or other symptoms: You can t get out of bed most days, can t work or engage in other necessary activities, you have trouble taking care of basic hygiene, or basic responsibilities, thoughts of suicide or death that will not go away, self-injurious behavior.     What you need to do:  1. Call your care  team and request a same-day appointment. If they are not available (weekends or after hours) call your local crisis line, emergency room or 911.      Electronically signed by: Yusra Kauffman, March 1, 2018    Depression Self Care Plan / Survival Kit    Self-Care for Depression  Here s the deal. Your body and mind are really not as separate as most people think.  What you do and think affects how you feel and how you feel influences what you do and think. This means if you do things that people who feel good do, it will help you feel better.  Sometimes this is all it takes.  There is also a place for medication and therapy depending on how severe your depression is, so be sure to consult with your medical provider and/ or Behavioral Health Consultant if your symptoms are worsening or not improving.     In order to better manage my stress, I will:    Exercise  Get some form of exercise, every day. This will help reduce pain and release endorphins, the  feel good  chemicals in your brain. This is almost as good as taking antidepressants!  This is not the same as joining a gym and then never going! (they count on that by the way ) It can be as simple as just going for a walk or doing some gardening, anything that will get you moving.      Hygiene   Maintain good hygiene (Get out of bed in the morning, Make your bed, Brush your teeth, Take a shower, and Get dressed like you were going to work, even if you are unemployed).  If your clothes don't fit try to get ones that do.    Diet  I will strive to eat foods that are good for me, drink plenty of water, and avoid excessive sugar, caffeine, alcohol, and other mood-altering substances.  Some foods that are helpful in depression are: complex carbohydrates, B vitamins, flaxseed, fish or fish oil, fresh fruits and vegetables.    Psychotherapy  I agree to participate in Individual Therapy (if recommended).    Medication  If prescribed medications, I agree to take them.   Missing doses can result in serious side effects.  I understand that drinking alcohol, or other illicit drug use, may cause potential side effects.  I will not stop my medication abruptly without first discussing it with my provider.    Staying Connected With Others  I will stay in touch with my friends, family members, and my primary care provider/team.    Use your imagination  Be creative.  We all have a creative side; it doesn t matter if it s oil painting, sand castles, or mud pies! This will also kick up the endorphins.    Witness Beauty  (AKA stop and smell the roses) Take a look outside, even in mid-winter. Notice colors, textures. Watch the squirrels and birds.     Service to others  Be of service to others.  There is always someone else in need.  By helping others we can  get out of ourselves  and remember the really important things.  This also provides opportunities for practicing all the other parts of the program.    Humor  Laugh and be silly!  Adjust your TV habits for less news and crime-drama and more comedy.    Control your stress  Try breathing deep, massage therapy, biofeedback, and meditation. Find time to relax each day.     My support system    Clinic Contact:  Phone number:    Contact 1:  Phone number:    Contact 2:  Phone number:    Uatsdin/:  Phone number:    Therapist:  Phone number:    Local crisis center:    Phone number:    Other community support:  Phone number:

## 2018-03-01 NOTE — PROGRESS NOTES
SUBJECTIVE:   Shruthi Vega is a 28 year old female who presents to clinic today for the following health issues:    Patient is getting over an IV drug addiction so she would like blood testing for HIV and hepatitis. She would also like STD screening. She is not currently having symptoms.    Depression and Anxiety Follow-Up    Status since last visit: Worsened     Other associated symptoms:None    Complicating factors:     Significant life event: Yes-  Getting over IV drug use, grandma passed away about one month ago     Current substance abuse: Amphetamines    PHQ-9 9/23/2016 3/1/2018   Total Score 23 24   Q9: Suicide Ideation Not at all Not at all     DAWN-7 SCORE 9/23/2016 3/1/2018   Total Score - 20 (severe anxiety)   Total Score 15 20       In the past two weeks have you had thoughts of suicide or self-harm?  No.    Do you have concerns about your personal safety or the safety of others?   No  PHQ-9  English  PHQ-9   Any Language  DAWN-7  Suicide Assessment Five-step Evaluation and Treatment (SAFE-T)    Concern for STD due to IV drug use   Patient would like to be tested of STD.     Patient Active Problem List   Diagnosis     CARDIOVASCULAR SCREENING; LDL GOAL LESS THAN 160     Recurrent vaginitis     Dayron     Female infertility     History of PID     Major depressive disorder, single episode, mild (H)     Herpes simplex type 1 infection     Anxiety     Alcohol abuse     Past Surgical History:   Procedure Laterality Date     MOUTH SURGERY      wisdom teeth       Social History   Substance Use Topics     Smoking status: Current Some Day Smoker     Packs/day: 0.25     Years: 8.00     Last attempt to quit: 3/19/2014     Smokeless tobacco: Never Used     Alcohol use No     Family History   Problem Relation Age of Onset     Hypertension Father          Current Outpatient Prescriptions   Medication Sig Dispense Refill     Aspirin-Acetaminophen-Caffeine (EXCEDRIN PO)        sertraline (ZOLOFT) 50 MG tablet  "Take 1/2 tablet (25 mg) for 1-2 weeks, then increase to 1 tablet orally daily 30 tablet 0     valACYclovir (VALTREX) 1000 mg tablet Take 1 tablet (1,000 mg) by mouth 2 times daily (Patient taking differently: Take 1,000 mg by mouth 2 times daily Taking once daily.) 20 tablet 2     IBUPROFEN PO Take 800 mg by mouth every 8 hours as needed for moderate pain       [DISCONTINUED] valACYclovir (VALTREX) 500 MG tablet Take 1 tablet (500 mg) by mouth 2 times daily 6 tablet 3     citalopram (CELEXA) 20 MG tablet Take 1/2 tablets (10 mg) once daily for 1-2 weeks, then increase 1 tablet (20 mg) daily after that. (Patient not taking: Reported on 3/1/2018) 30 tablet 1     Allergies   Allergen Reactions     Amoxicillin Other (See Comments)     Flu-like symptoms     Doxycycline Other (See Comments)     Flu-like symptoms       Reviewed and updated as needed this visit by clinical staff       Reviewed and updated as needed this visit by Provider         ROS:  Constitutional, HEENT, cardiovascular, pulmonary, gi and gu systems are negative, except as otherwise noted.    OBJECTIVE:     /78 (BP Location: Right arm, Cuff Size: Adult Regular)  Pulse 72  Temp 98.2  F (36.8  C) (Tympanic)  Ht 5' 7\" (1.702 m)  Wt 122 lb (55.3 kg)  LMP 02/28/2018  BMI 19.11 kg/m2  Body mass index is 19.11 kg/(m^2).   GENERAL: healthy, alert and no distress  EYES: Eyes grossly normal to inspection, PERRL and conjunctivae and sclerae normal  HENT: ear canals and TM's normal, nose and mouth without ulcers or lesions  NECK: no adenopathy, no asymmetry, masses, or scars and thyroid normal to palpation  RESP: lungs clear to auscultation - no rales, rhonchi or wheezes  CV: regular rate and rhythm, normal S1 S2, no S3 or S4, no murmur, click or rub, no peripheral edema and peripheral pulses strong  MS: no gross musculoskeletal defects noted, no edema  SKIN: no suspicious lesions or rashes  NEURO: Normal strength and tone, mentation intact and speech " normal  PSYCH: mentation appears normal, affect normal/bright      ASSESSMENT:       ICD-10-CM    1. Screen for STD (sexually transmitted disease) Z11.3 Neisseria gonorrhoeae PCR     Chlamydia trachomatis PCR     Hepatitis C antibody     Hepatitis B surface antigen     HIV Antigen Antibody Combo     CANCELED: HSV 1 and 2 DNA by PCR   2. Depression with anxiety F41.8 sertraline (ZOLOFT) 50 MG tablet     MENTAL HEALTH REFERRAL  - Adult; Assessments and Testing, Outpatient Treatment; General Psychological Testing; G: [facility] [phone]; We will contact you to schedule the appointment or please call with any questions; Brea...   3. Drug dependence (H) F19.20 MENTAL HEALTH REFERRAL  - Adult; Outpatient Treatment; Chemical Dependency Treatment; : [facility] (Inpatient) [phone]; Patient call to schedule         PLAN:   Patient currently denies any  suicidal thoughts or idealizations.  Does have history of anxiety is currently doing outpatient treatment and was looking to get into inpatient treatment for her IV drug use.  Has been on medications in the past would like to restart medications for her anxiety depression we will start her on Zoloft should have follow-up in 2-3 weeks.    We will also have her seen through [facility] for psych evaluation.  She is currently outpatient treatment and would like to get into inpatient treatment she  she is working through the [place] to get into inpatient inpatient treatment she will try both centers to see about getting into inpatient treatment and continue her outpatient treatment as directed drug treatment did also provide her with a number for our.    Did go ahead and order STD testing for her due to her IV drug use and her concerns.  Did recommend and did review with her that if  with hepatitis HIV labs are negative should have been repeated in  in 3 months and then again at 6 months patient is aware of that.    Patient will have  follow-up in 2-3 weeks will reassess her depression anxiety will also do  her annual exam at that time.  Patient Instructions       Treating Anxiety Disorders with Medicine  An anxiety disorder can make you feel nervous or apprehensive, even without a clear reason. In people age 65 and older, generalized anxiety disorder is one of the most commonly diagnosed anxiety disorders. Many times it occurs with depression. Certain anxiety disorders can cause intense feelings of fear or panic. You may even have physical symptoms such as a racing heartbeat, sweating, or dizziness. If you have these feelings, you don t have to suffer anymore. Treatment to help you overcome your fears will likely include therapy (also called counseling). Medicine may also be prescribed to help control your symptoms.    Medicines  Certain medicines may be prescribed to help control your symptoms. So you may feel less anxious. You may also feel able to move forward with therapy. At first, medicines and dosages may need to be adjusted to find what works best for you. Try to be patient. Tell your healthcare provider how a medicine makes you feel. This way, you can work together to find the treatment that s best for you. Keep in mind that medicines can have side effects. Talk with your provider about any side effects that are bothering you. Changing the dose or type of medicine may help. Don t stop taking medicine on your own. That can cause symptoms to come back.    Anti-anxiety medicine. This medicine eases symptoms and helps you relax. Your healthcare provider will explain when and how to use it. It may be prescribed for use before situations that make you anxious. You may also be told to take medicine on a regular schedule. Anti-anxiety medicine may make you feel a little sleepy or  out of it.  Don t drive a car or operate machinery while on this medicine, until you know how it affects you.  Caution  Never use alcohol or other drugs with  anti-anxiety medicines. This could result in loss of muscular control, sedation, coma, or death. Also, use only the amount of medicine prescribed for you. If you think you may have taken too much, get emergency care right away.     Antidepressant medicine. This kind of medicine is often used to treat anxiety, even if you aren t depressed. An antidepressant helps balance out brain chemicals. This helps keep anxiety under control. This medicine is taken on a schedule. It takes a few weeks to start working. If you don t notice a change at first, you may just need more time. But if you don t notice results after the first few weeks, tell your provider.  Keep taking medicines as prescribed  Never change your dosage, share or use another person's medicine, or stop taking your medicines without talking to your healthcare provider first. Keep the following in mind:    Some medicines must be taken on a schedule. Make this part of your daily routine. For instance, always take your pill before brushing your teeth. A pillbox can help you remember if you ve taken your medicine each day.    Medicines are often taken for 6 to 12 months. Your healthcare provider will then evaluate whether you need to stay on them. Many people who have also had therapy may no longer need medicine to manage anxiety.    You may need to stop taking medicine slowly to give your body time to adjust. When it s time to stop, your healthcare provider will tell you more. Remember: Never stop taking your medicine without talking to your provider first.    If symptoms return, you may need to start taking medicines again. This isn t your fault. It s just the nature of your anxiety disorder.  Special concerns    Side effects. Medicines may cause side effects. Ask your healthcare provider or pharmacist what you can expect. They may have ideas for avoiding some side effects.    Sexual problems. Some antidepressants can affect your desire for sex or your ability to  have an orgasm. A change in dosage or medicine often solves the problem. If you have a sexual side effect that concerns you, tell your healthcare provider.    Addiction. If you ve never had a problem with drugs or alcohol, you may not have a problem with medicines used to treat anxiety disorders. But always discuss the medicines with your healthcare provider before taking them. If you have a history of addiction, you may not be able to use certain medicines used to treat anxiety disorders.    Medicine interactions. Always check with your pharmacist before using any over-the-counter medicines, including herbal supplements.   Date Last Reviewed: 5/1/2017 2000-2017 [employer]. [address] [place] [address]. All rights reserved. This information is not intended as a substitute for professional medical care. Always follow your healthcare professional's instructions.        Anxiety Reaction  Anxiety is the feeling we all get when we think something bad might happen. It is a normal response to stress and usually causes only a mild reaction. When anxiety becomes more severe, it can interfere with daily life. In some cases, you may not even be aware of what it is you re anxious about. There may also be a genetic link or it may be a learned behavior in the home.  Both psychological and physical triggers cause stress reaction. It's often a response to fear or emotional stress, real or imagined. This stress may come from home, family, work, or social relationships.  During an anxiety reaction, you may feel:    Helpless    Nervous    Depressed    Irritable  Your body may show signs of anxiety in many ways. You may experience:    Dry mouth    Shakiness    Dizziness    Weakness    Trouble breathing    Breathing fast (hyperventilating)    Chest pressure    Sweating    Headache    Nausea    Diarrhea    Tiredness    Inability to sleep    Sexual problems  Home care    Try to locate the sources of stress  in your life. They may not be obvious. These may include:    Daily hassles of life (traffic jams, missed appointments, car troubles, etc.)    Major life changes, both good (new baby, job promotion) and bad (loss of job, loss of loved one)    Overload: feeling that you have too many responsibilities and can't take care of all of them at once    Feeling helpless, feeling that your problems are beyond what you re able to solve    Notice how your body reacts to stress. Learn to listen to your body signals. This will help you take action before the stress becomes severe.    When you can, do something about the source of your stress. (Avoid hassles, limit the amount of change that happens in your life at one time and take a break when you feel overloaded).    Unfortunately, many stressful situations can't be avoided. It is necessary to learn how to better manage stress. There are many proven methods that will reduce your anxiety. These include simple things like exercise, good nutrition and adequate rest. Also, there are certain techniques that are helpful:    Relaxation    Breathing exercises    Visualization    Biofeedback    Meditation  For more information about this, consult your doctor or go to a local bookstore and review the many books and tapes available on this subject.  Follow-up care  If you feel that your anxiety is not responding to self-help measures, contact your doctor or make an appointment with a counselor. You may need short-term psychological counseling and temporary medicine to help you manage stress.  Call 911  Call your healthcare provider right away if any of these occur:    Trouble breathing    Confusion    Drowsiness or trouble wakening    Fainting or loss of consciousness    Rapid heart rate    Seizure    New chest pain that becomes more severe, lasts longer, or spreads into your shoulder, arm, neck, jaw, or back  When to seek medical advice  Call your healthcare provider right away if any of  these occur:    Your symptoms get worse    Severe headache not relieved by rest and mild pain reliever  Date Last Reviewed: 9/29/2015 2000-2017 [employer]. [address] [place] [address], [place] [address]. All rights reserved. This information is not intended as a substitute for professional medical care. Always follow your healthcare professional's instructions.            ALBERT Engel [facility]

## 2018-03-01 NOTE — MR AVS SNAPSHOT
After Visit Summary   3/1/2018    Shruthi Vega    MRN: [id number]           Patient Information     Date Of Birth          1990        Visit Information        Provider Department      3/1/2018 9:00 AM Carri Jean APRN [facility]        Today's Diagnoses     Screen for STD (sexually transmitted disease)    -  1    Depression with anxiety          Care Instructions      Treating Anxiety Disorders with Medicine  An anxiety disorder can make you feel nervous or apprehensive, even without a clear reason. In people age 65 and older, generalized anxiety disorder is one of the most commonly diagnosed anxiety disorders. Many times it occurs with depression. Certain anxiety disorders can cause intense feelings of fear or panic. You may even have physical symptoms such as a racing heartbeat, sweating, or dizziness. If you have these feelings, you don t have to suffer anymore. Treatment to help you overcome your fears will likely include therapy (also called counseling). Medicine may also be prescribed to help control your symptoms.    Medicines  Certain medicines may be prescribed to help control your symptoms. So you may feel less anxious. You may also feel able to move forward with therapy. At first, medicines and dosages may need to be adjusted to find what works best for you. Try to be patient. Tell your healthcare provider how a medicine makes you feel. This way, you can work together to find the treatment that s best for you. Keep in mind that medicines can have side effects. Talk with your provider about any side effects that are bothering you. Changing the dose or type of medicine may help. Don t stop taking medicine on your own. That can cause symptoms to come back.    Anti-anxiety medicine. This medicine eases symptoms and helps you relax. Your healthcare provider will explain when and how to use it. It may be prescribed for use before situations that make you  anxious. You may also be told to take medicine on a regular schedule. Anti-anxiety medicine may make you feel a little sleepy or  out of it.  Don t drive a car or operate machinery while on this medicine, until you know how it affects you.  Caution  Never use alcohol or other drugs with anti-anxiety medicines. This could result in loss of muscular control, sedation, coma, or death. Also, use only the amount of medicine prescribed for you. If you think you may have taken too much, get emergency care right away.     Antidepressant medicine. This kind of medicine is often used to treat anxiety, even if you aren t depressed. An antidepressant helps balance out brain chemicals. This helps keep anxiety under control. This medicine is taken on a schedule. It takes a few weeks to start working. If you don t notice a change at first, you may just need more time. But if you don t notice results after the first few weeks, tell your provider.  Keep taking medicines as prescribed  Never change your dosage, share or use another person's medicine, or stop taking your medicines without talking to your healthcare provider first. Keep the following in mind:    Some medicines must be taken on a schedule. Make this part of your daily routine. For instance, always take your pill before brushing your teeth. A pillbox can help you remember if you ve taken your medicine each day.    Medicines are often taken for 6 to 12 months. Your healthcare provider will then evaluate whether you need to stay on them. Many people who have also had therapy may no longer need medicine to manage anxiety.    You may need to stop taking medicine slowly to give your body time to adjust. When it s time to stop, your healthcare provider will tell you more. Remember: Never stop taking your medicine without talking to your provider first.    If symptoms return, you may need to start taking medicines again. This isn t your fault. It s just the nature of your  anxiety disorder.  Special concerns    Side effects. Medicines may cause side effects. Ask your healthcare provider or pharmacist what you can expect. They may have ideas for avoiding some side effects.    Sexual problems. Some antidepressants can affect your desire for sex or your ability to have an orgasm. A change in dosage or medicine often solves the problem. If you have a sexual side effect that concerns you, tell your healthcare provider.    Addiction. If you ve never had a problem with drugs or alcohol, you may not have a problem with medicines used to treat anxiety disorders. But always discuss the medicines with your healthcare provider before taking them. If you have a history of addiction, you may not be able to use certain medicines used to treat anxiety disorders.    Medicine interactions. Always check with your pharmacist before using any over-the-counter medicines, including herbal supplements.   Date Last Reviewed: 5/1/2017 2000-2017 [employer]. [address] [place] [address]. All rights reserved. This information is not intended as a substitute for professional medical care. Always follow your healthcare professional's instructions.        Anxiety Reaction  Anxiety is the feeling we all get when we think something bad might happen. It is a normal response to stress and usually causes only a mild reaction. When anxiety becomes more severe, it can interfere with daily life. In some cases, you may not even be aware of what it is you re anxious about. There may also be a genetic link or it may be a learned behavior in the home.  Both psychological and physical triggers cause stress reaction. It's often a response to fear or emotional stress, real or imagined. This stress may come from home, family, work, or social relationships.  During an anxiety reaction, you may feel:    Helpless    Nervous    Depressed    Irritable  Your body may show signs of anxiety in many ways. You  may experience:    Dry mouth    Shakiness    Dizziness    Weakness    Trouble breathing    Breathing fast (hyperventilating)    Chest pressure    Sweating    Headache    Nausea    Diarrhea    Tiredness    Inability to sleep    Sexual problems  Home care    Try to locate the sources of stress in your life. They may not be obvious. These may include:    Daily hassles of life (traffic jams, missed appointments, car troubles, etc.)    Major life changes, both good (new baby, job promotion) and bad (loss of job, loss of loved one)    Overload: feeling that you have too many responsibilities and can't take care of all of them at once    Feeling helpless, feeling that your problems are beyond what you re able to solve    Notice how your body reacts to stress. Learn to listen to your body signals. This will help you take action before the stress becomes severe.    When you can, do something about the source of your stress. (Avoid hassles, limit the amount of change that happens in your life at one time and take a break when you feel overloaded).    Unfortunately, many stressful situations can't be avoided. It is necessary to learn how to better manage stress. There are many proven methods that will reduce your anxiety. These include simple things like exercise, good nutrition and adequate rest. Also, there are certain techniques that are helpful:    Relaxation    Breathing exercises    Visualization    Biofeedback    Meditation  For more information about this, consult your doctor or go to a local bookstore and review the many books and tapes available on this subject.  Follow-up care  If you feel that your anxiety is not responding to self-help measures, contact your doctor or make an appointment with a counselor. You may need short-term psychological counseling and temporary medicine to help you manage stress.  Call 911  Call your healthcare provider right away if any of these occur:    Trouble  breathing    Confusion    Drowsiness or trouble wakening    Fainting or loss of consciousness    Rapid heart rate    Seizure    New chest pain that becomes more severe, lasts longer, or spreads into your shoulder, arm, neck, jaw, or back  When to seek medical advice  Call your healthcare provider right away if any of these occur:    Your symptoms get worse    Severe headache not relieved by rest and mild pain reliever  Date Last Reviewed: 9/29/2015 2000-2017 [employer]. [address] [place] [address]. All rights reserved. This information is not intended as a substitute for professional medical care. Always follow your healthcare professional's instructions.                Follow-ups after your visit        Additional Services     MENTAL HEALTH REFERRAL  - Adult; Assessments and Testing, Outpatient Treatment; General Psychological Testing; [facility] [place] [facility] [phone]; We will contact you to schedule the appointment or please call with any questions; Brea...       All scheduling is subject to the client's specific insurance plan & benefits, provider/location availability, and provider clinical specialities.  Please arrive 15 minutes early for your first appointment and bring your completed paperwork.    Please be aware that coverage of these services is subject to the terms and limitations of your health insurance plan.  Call member services at your health plan with any benefit or coverage questions.                            Who to contact     If you have questions or need follow up information about today's clinic visit or your schedule please contact [facility] directly at [phone].  Normal or non-critical lab and imaging results will be communicated to you by MyChart, letter or phone within 4 business days after the clinic has received the results. If you do not hear from us within 7 days, please contact the clinic through MyChart or phone.  "If you have a critical or abnormal lab result, we will notify you by phone as soon as possible.  Submit refill requests through [employer] or call your pharmacy and they will forward the refill request to us. Please allow 3 business days for your refill to be completed.          Additional Information About Your Visit        [employer] Information     [employer] gives you secure access to your electronic health record. If you see a primary care provider, you can also send messages to your care team and make appointments. If you have questions, please call your primary care clinic.  If you do not have a primary care provider, please call [phone] and they will assist you.        Care EveryWhere ID     This is your Care EveryWhere ID. This could be used by other organizations to access your [place] medical records  [id number]        Your Vitals Were     Pulse Temperature Height Last Period BMI (Body Mass Index)       72 98.2  F (36.8  C) (Tympanic) 5' 7\" (1.702 m) 02/28/2018 19.11 kg/m2        Blood Pressure from Last 3 Encounters:   03/01/18 132/78   01/18/17 122/78   12/11/16 131/88    Weight from Last 3 Encounters:   03/01/18 122 lb (55.3 kg)   01/18/17 124 lb 6.4 oz (56.4 kg)   12/11/16 121 lb 9.6 oz (55.2 kg)              We Performed the Following     Chlamydia trachomatis PCR     DEPRESSION ACTION PLAN (DAP)     Hepatitis B surface antigen     Hepatitis C antibody     HSV 1 and 2 DNA by PCR     MENTAL HEALTH REFERRAL  - Adult; Assessments and Testing, Outpatient Treatment; General Psychological Testing; [facility] [place]: [facility] [phone]; We will contact you to schedule the appointment or please call with any questions; Brea...     Neisseria gonorrhoeae PCR          Today's Medication Changes          These changes are accurate as of 3/1/18  9:58 AM.  If you have any questions, ask your nurse or doctor.               Start taking these medicines.        Dose/Directions    sertraline 50 MG tablet "   Commonly known as:  ZOLOFT   Used for:  Depression with anxiety   Started by:  Carri Jean APRN CNP        Take 1/2 tablet (25 mg) for 1-2 weeks, then increase to 1 tablet orally daily   Quantity:  30 tablet   Refills:  0         These medicines have changed or have updated prescriptions.        Dose/Directions    valACYclovir 1000 mg tablet   Commonly known as:  VALTREX   This may have changed:    - additional instructions  - Another medication with the same name was removed. Continue taking this medication, and follow the directions you see here.   Used for:  Recurrent vaginitis, Herpes simplex type 1 infection        Dose:  1000 mg   Take 1 tablet (1,000 mg) by mouth 2 times daily   Quantity:  20 tablet   Refills:  2         Stop taking these medicines if you haven't already. Please contact your care team if you have questions.     ONE-A-DAY WOMENS PO   Stopped by:  Carri Jean APRN CNP                Where to get your medicines      These medications were sent to [facility] [place] [id number]     Phone:  [phone]     sertraline 50 MG tablet                Primary Care Provider Office Phone # Fax #    Juanis Vazquezronnie Bradford [phone] [address] [facility] [address]        Equal Access to Services     SHAHEEN JOHNSTON : Hadii mert ku hadasho Soomaali, waaxda luqadaha, qaybta kaalmada adeegyada, waxay jackson [employer]odge. So [facility] [phone].    ATENCIÓN: Si habla español, tiene a tipton disposición servicios gratuitos de asistencia lingüística. bianka al [phone].    We comply with applicable federal civil rights laws and [place] laws. We do not discriminate on the basis of race, color, national origin, age, disability, sex, sexual orientation, or gender identity.            Thank you!     Thank you for choosing [facility]  for your care. Our goal is  always to provide you with excellent care. Hearing back from our patients is one way we can continue to improve our services. Please take a few minutes to complete the written survey that you may receive in the mail after your visit with us. Thank you!             Your Updated Medication List - Protect others around you: Learn how to safely use, store and throw away your medicines at www.disposemymeds.org.          This list is accurate as of 3/1/18  9:58 AM.  Always use your most recent med list.                   Brand Name Dispense Instructions for use Diagnosis    citalopram 20 MG tablet    celeXA    30 tablet    Take 1/2 tablets (10 mg) once daily for 1-2 weeks, then increase 1 tablet (20 mg) daily after that.    Major depressive disorder, single episode, mild (H)       EXCEDRIN PO           IBUPROFEN PO      Take 800 mg by mouth every 8 hours as needed for moderate pain        sertraline 50 MG tablet    ZOLOFT    30 tablet    Take 1/2 tablet (25 mg) for 1-2 weeks, then increase to 1 tablet orally daily    Depression with anxiety       valACYclovir 1000 mg tablet    VALTREX    20 tablet    Take 1 tablet (1,000 mg) by mouth 2 times daily    Recurrent vaginitis, Herpes simplex type 1 infection

## 2018-03-02 LAB
C TRACH DNA SPEC QL NAA+PROBE: NEGATIVE
HBV SURFACE AG SERPL QL IA: NONREACTIVE
HCV AB SERPL QL IA: NONREACTIVE
HIV 1+2 AB+HIV1 P24 AG SERPL QL IA: NONREACTIVE
N GONORRHOEA DNA SPEC QL NAA+PROBE: NEGATIVE
SPECIMEN SOURCE: NORMAL
SPECIMEN SOURCE: NORMAL

## 2018-03-02 ASSESSMENT — ANXIETY QUESTIONNAIRES: GAD7 TOTAL SCORE: 20

## 2018-03-07 ENCOUNTER — OFFICE VISIT (OUTPATIENT)
Dept: FAMILY MEDICINE | Facility: CLINIC | Age: 28
End: 2018-03-07
Payer: COMMERCIAL

## 2018-03-07 VITALS
WEIGHT: 120.13 LBS | HEIGHT: 67 IN | SYSTOLIC BLOOD PRESSURE: 120 MMHG | BODY MASS INDEX: 18.85 KG/M2 | HEART RATE: 80 BPM | DIASTOLIC BLOOD PRESSURE: 72 MMHG | OXYGEN SATURATION: 100 % | TEMPERATURE: 99.3 F

## 2018-03-07 DIAGNOSIS — N89.8 VAGINAL DISCHARGE: ICD-10-CM

## 2018-03-07 DIAGNOSIS — B37.31 YEAST INFECTION OF THE VAGINA: ICD-10-CM

## 2018-03-07 DIAGNOSIS — B96.89 BV (BACTERIAL VAGINOSIS): Primary | ICD-10-CM

## 2018-03-07 DIAGNOSIS — N76.0 BV (BACTERIAL VAGINOSIS): Primary | ICD-10-CM

## 2018-03-07 DIAGNOSIS — N89.8 VAGINAL ITCHING: ICD-10-CM

## 2018-03-07 LAB
SPECIMEN SOURCE: ABNORMAL
WET PREP SPEC: ABNORMAL

## 2018-03-07 PROCEDURE — 99214 OFFICE O/P EST MOD 30 MIN: CPT | Performed by: PHYSICIAN ASSISTANT

## 2018-03-07 PROCEDURE — 87210 SMEAR WET MOUNT SALINE/INK: CPT | Performed by: PHYSICIAN ASSISTANT

## 2018-03-07 RX ORDER — METRONIDAZOLE 500 MG/1
500 TABLET ORAL 2 TIMES DAILY
Qty: 14 TABLET | Refills: 0 | Status: SHIPPED | OUTPATIENT
Start: 2018-03-07 | End: 2019-09-21

## 2018-03-07 RX ORDER — FLUCONAZOLE 150 MG/1
150 TABLET ORAL ONCE
Qty: 2 TABLET | Refills: 0 | Status: SHIPPED | OUTPATIENT
Start: 2018-03-07 | End: 2018-03-07

## 2018-03-07 NOTE — PROGRESS NOTES
SUBJECTIVE:   Shruthi Vega is a 28 year old female who presents to clinic today for the following health issues:      Vaginal Symptoms  Onset: yesterday  Itching and burning- feels internal, maybe little bit of discharge.  Finishing period. Patient's last menstrual period was 02/28/2018. normal period for her.       Description:  Vaginal Discharge: creamy   Itching (Pruritis): YES  Burning sensation:  YES  Odor: YES    Accompanying Signs & Symptoms:  Pain with Urination: no  Abdominal Pain: no   Fever: no     History:   Sexually active: YES  New Partner: YES -- new partner 1 week ago. No condom. Not doing anything for pregnancy prevention- normally use condoms. Had STD testing 03/01/2018 - no exam- just self swab and blood work.   Possibility of Pregnancy:  No    Precipitating factors:   Recent Antibiotic Use: no     Alleviating factors:  none  Therapies Tried and outcome: nothing        Problem list and histories reviewed & adjusted, as indicated.  Additional history: as documented    Patient Active Problem List   Diagnosis     CARDIOVASCULAR SCREENING; LDL GOAL LESS THAN 160     Recurrent vaginitis     [facility]     Female infertility     History of PID     Major depressive disorder, single episode, mild (H)     Herpes simplex type 1 infection     Anxiety     Alcohol abuse     Past Surgical History:   Procedure Laterality Date     MOUTH SURGERY      wisdom teeth       Social History   Substance Use Topics     Smoking status: Current Every Day Smoker     Packs/day: 0.25     Years: 8.00     Types: Cigarettes     Last attempt to quit: 3/19/2014     Smokeless tobacco: Never Used     Alcohol use No     Family History   Problem Relation Age of Onset     Hypertension Father          Current Outpatient Prescriptions   Medication Sig Dispense Refill     Aspirin-Acetaminophen-Caffeine (EXCEDRIN PO)        valACYclovir (VALTREX) 1000 mg tablet Take 1 tablet (1,000 mg) by mouth 2 times daily (Patient taking  "differently: Take 1,000 mg by mouth 2 times daily Taking once daily.) 20 tablet 2     IBUPROFEN PO Take 800 mg by mouth every 8 hours as needed for moderate pain       sertraline (ZOLOFT) 50 MG tablet Take 1/2 tablet (25 mg) for 1-2 weeks, then increase to 1 tablet orally daily (Patient not taking: Reported on 3/7/2018) 30 tablet 0     citalopram (CELEXA) 20 MG tablet Take 1/2 tablets (10 mg) once daily for 1-2 weeks, then increase 1 tablet (20 mg) daily after that. (Patient not taking: Reported on 3/1/2018) 30 tablet 1     Allergies   Allergen Reactions     Amoxicillin Other (See Comments)     Flu-like symptoms     Doxycycline Other (See Comments)     Flu-like symptoms     BP Readings from Last 3 Encounters:   03/07/18 120/72   03/01/18 132/78   01/18/17 122/78    Wt Readings from Last 3 Encounters:   03/07/18 120 lb 2 oz (54.5 kg)   03/01/18 122 lb (55.3 kg)   01/18/17 124 lb 6.4 oz (56.4 kg)                  Labs reviewed in EPIC    Reviewed and updated as needed this visit by clinical staff       Reviewed and updated as needed this visit by Provider         ROS:  As above  No fevers, chills  No appetite change  Bowels tend to constipation, no change. No nausea, vomiting  No uri sx    OBJECTIVE:     /72 (BP Location: Left arm, Patient Position: Chair, Cuff Size: Adult Regular)  Pulse 80  Temp 99.3  F (37.4  C) (Temporal)  Ht 5' 7\" (1.702 m)  Wt 120 lb 2 oz (54.5 kg)  LMP 02/28/2018  SpO2 100%  BMI 18.81 kg/m2  Body mass index is 18.81 kg/(m^2).  GENERAL: alert, no distress, slender, well appearing  ABDOMEN: soft, nontender, no suprapubic tenderness, no masses and bowel sounds normal   (female): normal female external genitalia, no lesions; normal urethral meatus, vaginal pink mucosa, copious white curd-like discharge in vagina, normal cervix  MS: no gross musculoskeletal defects noted, no edema  SKIN: no suspicious lesions or rashes  NEURO: Normal strength and tone, mentation intact and speech " normal  BACK: no CVA tenderness, no paralumbar tenderness    Diagnostic Test Results:  Results for orders placed or performed in visit on 03/07/18 (from the past 24 hour(s))   Wet prep   Result Value Ref Range    Specimen Description Vagina     Wet Prep No Trichomonas seen     Wet Prep Clue cells seen (A)     Wet Prep Yeast seen (A)        ASSESSMENT/PLAN:     1. BV (bacterial vaginosis)  Treat as below  Discussed interaction with alcohol and need for avoidance.  Safe sex practices discussed and encouraged. Pt declined STD screening since it was done last week.   - metroNIDAZOLE (FLAGYL) 500 MG tablet; Take 1 tablet (500 mg) by mouth 2 times daily  Dispense: 14 tablet; Refill: 0    2. Yeast infection of the vagina  Treatment as below. Pt reports recurrent infections and often needs 2 doses. Discussed how to take and possible side effects  - fluconazole (DIFLUCAN) 150 MG tablet; Take 1 tablet (150 mg) by mouth once for 1 dose Repeat dose in 72hrs.  Dispense: 2 tablet; Refill: 0    3. Vaginal itching  - Wet prep    4. Vaginal discharge  - Wet prep        Follow Up: For worsening symptoms (ie new fevers, worsening pain, etc), non-improvement as expected/discussed, questions regarding your medications or treatment plan. Discussed parameters for follow up and included in After Visit Summary given to patient.      Charis Bahena PA-C  [facility]

## 2018-03-07 NOTE — MR AVS SNAPSHOT
After Visit Summary   3/7/2018    Shruthi Vega    MRN: [id number]           Patient Information     Date Of Birth          1990        Visit Information        Provider Department      3/7/2018 10:40 AM Charis Bahena PA-C [facility]        Today's Diagnoses     BV (bacterial vaginosis)    -  1    Vaginal itching        Vaginal discharge        Recurrent vaginitis        Yeast infection of the vagina           Follow-ups after your visit        Your next 10 appointments already scheduled     Apr 10, 2018  1:00 PM CDT   (Arrive by 12:30 PM)   New Visit with Jaleel Chirinos [facility] ([facility]    [address] [place] [address]   [phone]            Apr 17, 2018 10:00 AM CDT   Return Visit with Jaleel Chirinos [facility] ([address] [address]   [phone]              Who to contact     If you have questions or need follow up information about today's clinic visit or your schedule please contact [facility] directly at [phone].  Normal or non-critical lab and imaging results will be communicated to you by MyChart, letter or phone within 4 business days after the clinic has received the results. If you do not hear from us within 7 days, please contact the clinic through [employer] for [employer] or phone. If you have a critical or abnormal lab result, we will notify you by phone as soon as possible.  Submit refill requests through [employer] or call your pharmacy and they will forward the refill request to us. Please allow 3 business days for your refill to be completed.          Additional Information About Your Visit        MyChart Information     [employer] gives you secure access to your electronic health record. If you see a primary care provider, you can also send messages to your care team and make  "appointments. If you have questions, please call your primary care clinic.  If you do not have a primary care provider, please call [phone] and they will assist you.        Care EveryWhere ID     This is your Care EveryWhere ID. This could be used by other organizations to access your [facility] medical records  [id number]        Your Vitals Were     Pulse Temperature Height Last Period Pulse Oximetry BMI (Body Mass Index)    80 99.3  F (37.4  C) (Temporal) 5' 7\" (1.702 m) 02/28/2018 100% 18.81 kg/m2       Blood Pressure from Last 3 Encounters:   03/07/18 120/72   03/01/18 132/78   01/18/17 122/78    Weight from Last 3 Encounters:   03/07/18 120 lb 2 oz (54.5 kg)   03/01/18 122 lb (55.3 kg)   01/18/17 124 lb 6.4 oz (56.4 kg)              We Performed the Following     Wet prep          Today's Medication Changes          These changes are accurate as of 3/7/18 11:33 AM.  If you have any questions, ask your nurse or doctor.               Start taking these medicines.        Dose/Directions    fluconazole 150 MG tablet   Commonly known as:  DIFLUCAN   Used for:  Yeast infection of the vagina   Started by:  Charis Bahena PA-C        Dose:  150 mg   Take 1 tablet (150 mg) by mouth once for 1 dose Repeat dose in 72hrs.   Quantity:  2 tablet   Refills:  0       metroNIDAZOLE 500 MG tablet   Commonly known as:  FLAGYL   Used for:  BV (bacterial vaginosis)   Started by:  Charis Bahena PA-C        Dose:  500 mg   Take 1 tablet (500 mg) by mouth 2 times daily   Quantity:  14 tablet   Refills:  0         These medicines have changed or have updated prescriptions.        Dose/Directions    valACYclovir 1000 mg tablet   Commonly known as:  VALTREX   This may have changed:  additional instructions   Used for:  Recurrent vaginitis, Herpes simplex type 1 infection        Dose:  1000 mg   Take 1 tablet (1,000 mg) by mouth 2 times daily   Quantity:  20 tablet   Refills:  2            Where to get your medicines    "   These medications were sent to [facility] [address] [place], [place] - [address] [facility]  [address] [facility] [address]     Phone:  [phone]     fluconazole 150 MG tablet    metroNIDAZOLE 500 MG tablet                Primary Care Provider Office Phone # Fax #    Juanis BradfordRANDOLPH [phone] [address] [facility] [address]        Equal Access to Services     SHAHEEN JOHNSTON : Hadii aad ku hadasho Soomaali, waaxda luqadaha, qaybta kaalmada adeegyada, waxay idiin hayaan adeeg kharash laoriana . So [facility] [phone].    ATENCIÓN: Si habla español, tiene a tipton disposición servicios gratuitos de asistencia lingüística. Eros al [phone].    We comply with applicable federal civil rights laws and [place] laws. We do not discriminate on the basis of race, color, national origin, age, disability, sex, sexual orientation, or gender identity.            Thank you!     Thank you for choosing [facility]  for your care. Our goal is always to provide you with excellent care. Hearing back from our patients is one way we can continue to improve our services. Please take a few minutes to complete the written survey that you may receive in the mail after your visit with us. Thank you!             Your Updated Medication List - Protect others around you: Learn how to safely use, store and throw away your medicines at www.disposemymeds.org.          This list is accurate as of 3/7/18 11:33 AM.  Always use your most recent med list.                   Brand Name Dispense Instructions for use Diagnosis    EXCEDRIN PO           fluconazole 150 MG tablet    DIFLUCAN    2 tablet    Take 1 tablet (150 mg) by mouth once for 1 dose Repeat dose in 72hrs.    Yeast infection of the vagina       IBUPROFEN PO      Take 800 mg by mouth every 8 hours as needed for moderate pain        metroNIDAZOLE 500 MG tablet    FLAGYL    14 tablet    Take 1 tablet (500 mg) by mouth 2 times daily    BV  (bacterial vaginosis)       sertraline 50 MG tablet    ZOLOFT    30 tablet    Take 1/2 tablet (25 mg) for 1-2 weeks, then increase to 1 tablet orally daily    Depression with anxiety       valACYclovir 1000 mg tablet    VALTREX    20 tablet    Take 1 tablet (1,000 mg) by mouth 2 times daily    Recurrent vaginitis, Herpes simplex type 1 infection

## 2019-09-21 ENCOUNTER — HOSPITAL ENCOUNTER (EMERGENCY)
Facility: CLINIC | Age: 29
Discharge: HOME | End: 2019-09-21
Attending: NURSE PRACTITIONER | Admitting: NURSE PRACTITIONER
Payer: COMMERCIAL

## 2019-09-21 VITALS
BODY MASS INDEX: 18.81 KG/M2 | DIASTOLIC BLOOD PRESSURE: 87 MMHG | TEMPERATURE: 98 F | HEIGHT: 67 IN | OXYGEN SATURATION: 98 % | RESPIRATION RATE: 16 BRPM | SYSTOLIC BLOOD PRESSURE: 136 MMHG | HEART RATE: 84 BPM

## 2019-09-21 DIAGNOSIS — L03.818 CELLULITIS OF OTHER SPECIFIED SITE: ICD-10-CM

## 2019-09-21 PROCEDURE — G0463 HOSPITAL OUTPT CLINIC VISIT: HCPCS | Performed by: NURSE PRACTITIONER

## 2019-09-21 PROCEDURE — 99214 OFFICE O/P EST MOD 30 MIN: CPT | Mod: Z6 | Performed by: NURSE PRACTITIONER

## 2019-09-21 RX ORDER — ESCITALOPRAM OXALATE 10 MG/1
10 TABLET ORAL
COMMUNITY
Start: 2019-07-19 | End: 2023-02-21

## 2019-09-21 RX ORDER — HYDROXYZINE HYDROCHLORIDE 25 MG/1
25 TABLET, FILM COATED ORAL 3 TIMES DAILY PRN
Qty: 60 TABLET | Refills: 0 | Status: SHIPPED | OUTPATIENT
Start: 2019-09-21 | End: 2023-02-21

## 2019-09-21 RX ORDER — CLINDAMYCIN HCL 300 MG
300 CAPSULE ORAL 4 TIMES DAILY
Qty: 40 CAPSULE | Refills: 0 | Status: SHIPPED | OUTPATIENT
Start: 2019-09-21 | End: 2019-09-28

## 2019-09-21 ASSESSMENT — ENCOUNTER SYMPTOMS
SHORTNESS OF BREATH: 1
TROUBLE SWALLOWING: 0
FEVER: 0
NAUSEA: 0
FATIGUE: 1
HEADACHES: 0
FACIAL SWELLING: 0
SINUS PAIN: 0
SORE THROAT: 0
CHILLS: 0
DIAPHORESIS: 0
DIZZINESS: 0
DIARRHEA: 0
EYE DISCHARGE: 0
NUMBNESS: 0
CHEST TIGHTNESS: 0
COUGH: 0
ACTIVITY CHANGE: 0

## 2019-09-21 NOTE — ED AVS SNAPSHOT
[facility] Emergency Department  [address] [facility] [address]  Phone:  [phone]  Fax:  [phone]                                    Shruthi Vega   MRN: [id number]    Department:  [facility] Emergency Department   Date of Visit:  9/21/2019           After Visit Summary Signature Page    I have received my discharge instructions, and my questions have been answered. I have discussed any challenges I see with this plan with the nurse or doctor.    ..........................................................................................................................................  Patient/Patient Representative Signature      ..........................................................................................................................................  Patient Representative Print Name and Relationship to Patient    ..................................................               ................................................  Date                                   Time    ..........................................................................................................................................  Reviewed by Signature/Title    ...................................................              ..............................................  Date                                               Time          22EPIC Rev 08/18

## 2019-09-21 NOTE — ED PROVIDER NOTES
History     Chief Complaint   Patient presents with     Rash     Otalgia     HPI  Shruthi Vega is a 29 year old female who presents to the urgent care for itching and right ear pain.  Shruthi states that this has been going on for months but today she decided to come in because she could not stand it anymore.  She states that the affected areas are itchy and painful with any pressure.  She states the worst spot is on her right ear but she also has spots on her scalp and on her back.  She denies any wounds on her lower body.  Shruthi also complains of fatigue and shortness of breath that she has had for months.  Shruthi reports to using methamphetamines and states that she has been using more in the last couple of months.  She has tried peroxide triple-A antibiotic cream and calamine lotion with only little help. She denies any bug bites or other reasons for possible causes.  She denies any other complaints at this time.      Allergies:  Allergies   Allergen Reactions     Amoxicillin Other (See Comments)     Flu-like symptoms     Doxycycline Other (See Comments)     Flu-like symptoms       Problem List:    Patient Active Problem List    Diagnosis Date Noted     Anxiety 09/23/2016     Priority: Medium     Alcohol abuse 09/23/2016     Priority: Medium     Herpes simplex type 1 infection 03/16/2016     Priority: Medium     Major depressive disorder, single episode, mild (H) 02/29/2016     Priority: Medium     Female infertility 03/02/2015     Priority: Medium     Infertility tests:  Baseline Labs-normal, including normal antimullerian hormone  Tubal Factors-HSG-normal  Male Factor-  Ovulation- luteal prog=7.6    Plan- offered Clomid ovulation enhancement       History of PID 03/02/2015     Priority: Medium     Recurrent vaginitis 04/04/2014     Priority: Medium     Alternates between yeast and BV  trimosan         Mittelschmerz 04/04/2014     Priority: Medium     CARDIOVASCULAR SCREENING; LDL GOAL LESS THAN 160  "2013     Priority: Medium        Past Medical History:    Past Medical History:   Diagnosis Date     GERD (gastroesophageal reflux disease)        Past Surgical History:    Past Surgical History:   Procedure Laterality Date     MOUTH SURGERY      wisdom teeth       Family History:    Family History   Problem Relation Age of Onset     Hypertension Father        Social History:  Marital Status:  Single [1]  Social History     Tobacco Use     Smoking status: Current Every Day Smoker     Packs/day: 0.25     Years: 8.00     Pack years: 2.00     Types: Cigarettes     Last attempt to quit: 3/19/2014     Years since quittin.5     Smokeless tobacco: Never Used   Substance Use Topics     Alcohol use: No     Alcohol/week: 0.0 oz     Drug use: Yes     Types: Methamphetamines        Medications:      clindamycin (CLEOCIN) 300 MG capsule   escitalopram (LEXAPRO) 10 MG tablet   hydrOXYzine (ATARAX) 25 MG tablet   Aspirin-Acetaminophen-Caffeine (EXCEDRIN PO)   IBUPROFEN PO   valACYclovir (VALTREX) 1000 mg tablet     Review of Systems   Constitutional: Positive for fatigue. Negative for activity change, chills, diaphoresis and fever.   HENT: Positive for ear pain. Negative for dental problem, facial swelling, sinus pain, sore throat and trouble swallowing.    Eyes: Negative for discharge.   Respiratory: Positive for shortness of breath. Negative for cough and chest tightness.         Shortness of breath for months   Cardiovascular: Negative for chest pain.   Gastrointestinal: Negative for diarrhea and nausea.   Skin: Positive for rash.   Neurological: Negative for dizziness, numbness and headaches.       Physical Exam   BP: 136/87  Pulse: 84  Temp: 98  F (36.7  C)  Resp: 16  Height: 170.2 cm (5' 7\")  SpO2: 98 %      Physical Exam   HENT:   Right Ear: Tympanic membrane and ear canal normal.   Left Ear: Tympanic membrane, external ear and ear canal normal.   Eyes: Pupils are equal, round, and reactive to light. "   Cardiovascular: Normal rate, regular rhythm and normal heart sounds.   Pulmonary/Chest: Effort normal and breath sounds normal.   Skin:   Multiple scabbed over areas noted on back, scalp, and face.  One wound noted on pinna of right ear has erythema and some swelling.   Wounds are all about pea-sized.         ED Course     No results found for this or any previous visit (from the past 24 hour(s)).    Medications - No data to display    Assessments & Plan (with Medical Decision Making)     Shruthi is a 29 year old female in today with a rash that itches.  There is concern that there is a cellulitis on the right ear wound, other wounds are scabbed over and don't appear infected at this time.  Given Shruthi's history of drug use and her reported increased use in the last few months, I presume her risk for infection is high.            I have reviewed the nursing notes.    I have reviewed the findings, diagnosis, plan and need for follow up with the patient.    New Prescriptions    CLINDAMYCIN (CLEOCIN) 300 MG CAPSULE    Take 1 capsule (300 mg) by mouth 4 times daily for 7 days    HYDROXYZINE (ATARAX) 25 MG TABLET    Take 1 tablet (25 mg) by mouth 3 times daily as needed for itching     -Apply aquaphor to affected areas  -Ok to take Ibuprofen/tylenol for pain/discomfort  -Educated on the importance of drug use cessation  -Follow up with PCP in 7 days or sooner if symptoms don't resolve or worsen.     Final diagnoses:   Cellulitis of other specified site     ALBERT Friedman CNP    9/21/2019   [facility] EMERGENCY DEPARTMENT     Jenae Oropeza APRN CNP  09/21/19 [id number]

## 2019-09-21 NOTE — DISCHARGE INSTRUCTIONS
-Apply aquaphor to affected areas  -Ok to take Ibuprofen/tylenol for pain/discomfort  -Educated on the importance of drug use cessation

## 2019-09-29 ENCOUNTER — HEALTH MAINTENANCE LETTER (OUTPATIENT)
Age: 29
End: 2019-09-29

## 2019-11-28 ENCOUNTER — HOSPITAL ENCOUNTER (EMERGENCY)
Facility: CLINIC | Age: 29
Discharge: HOME | End: 2019-11-28
Attending: PHYSICIAN ASSISTANT | Admitting: PHYSICIAN ASSISTANT
Payer: COMMERCIAL

## 2019-11-28 VITALS
OXYGEN SATURATION: 100 % | HEART RATE: 75 BPM | RESPIRATION RATE: 18 BRPM | SYSTOLIC BLOOD PRESSURE: 114 MMHG | DIASTOLIC BLOOD PRESSURE: 77 MMHG | TEMPERATURE: 98 F

## 2019-11-28 DIAGNOSIS — J06.9 UPPER RESPIRATORY TRACT INFECTION, UNSPECIFIED TYPE: ICD-10-CM

## 2019-11-28 DIAGNOSIS — K08.89 PAIN, DENTAL: ICD-10-CM

## 2019-11-28 DIAGNOSIS — K05.10 GINGIVITIS: ICD-10-CM

## 2019-11-28 PROCEDURE — 99284 EMERGENCY DEPT VISIT MOD MDM: CPT | Mod: Z6 | Performed by: PHYSICIAN ASSISTANT

## 2019-11-28 PROCEDURE — 99282 EMERGENCY DEPT VISIT SF MDM: CPT | Performed by: PHYSICIAN ASSISTANT

## 2019-11-28 RX ORDER — CLINDAMYCIN HCL 300 MG
300 CAPSULE ORAL 4 TIMES DAILY
Qty: 40 CAPSULE | Refills: 0 | Status: SHIPPED | OUTPATIENT
Start: 2019-11-28 | End: 2019-12-08

## 2019-11-28 ASSESSMENT — ENCOUNTER SYMPTOMS
EYES NEGATIVE: 1
WHEEZING: 0
SORE THROAT: 1
SHORTNESS OF BREATH: 0
CARDIOVASCULAR NEGATIVE: 1
COUGH: 1
RHINORRHEA: 1
VOICE CHANGE: 0
TROUBLE SWALLOWING: 0
NEUROLOGICAL NEGATIVE: 1
GASTROINTESTINAL NEGATIVE: 1
MUSCULOSKELETAL NEGATIVE: 1
FEVER: 0

## 2019-11-28 NOTE — DISCHARGE INSTRUCTIONS
Use antibiotic as directed.   Patient informed to rest, warm compresses over sinuses as needed, stay hydrated, cool humidifier, salt water gargles.  Lots of rest and fluids.  Tylenol or ibuprofen as needed.  Patient to see dentist as soon as possible for further evaluation and treatment of dental issues.     Return if any worsening symptoms or if not improving.  Go to Emergency Room if SOB, chest pain, painful breathing, worst headache of life, active abdominal pain, facial swelling, sore throat, difficulty swallowing or change in voice, or fevers > 104F occur.     Patient voiced understanding of instructions given.

## 2019-11-28 NOTE — ED NOTES
Right upper tooth/gum pain and bleeding for approximately a month ago.  Worsening pain for approximately 1 week with sore throat and bumps on back of tongue. Has not taken anything for pain today.

## 2019-11-28 NOTE — ED PROVIDER NOTES
History     Chief Complaint   Patient presents with     Dental Pain     upper left molar pain      HPI  Shruthi Vega is a 29 year old female who presents today with left upper molar pain that started about 1 week ago. Patient states she has noticed gum bleeding on and off for the past month when brushing teeth. Patient states she has poor dentition and smokes about 6 cigarettes daily. Patient also with URI symptoms for the past few weeks. Patient denies fevers, headache, facial swelling, dysphonia or dysphagia, trismus, shortness of breath, wheezing, abdominal pain, nausea/vomiting, or diarrhea. Patient states mild sore throat, dry cough, runny nose/congestion. Patient has been taking tylenol and ibuprofen over the counter for the pain. Patient states she called a dental office yesterday, but was unable to get in with them until next week.     Allergies:  Allergies   Allergen Reactions     Amoxicillin Other (See Comments)     Flu-like symptoms     Doxycycline Other (See Comments)     Flu-like symptoms       Problem List:    Patient Active Problem List    Diagnosis Date Noted     Anxiety 09/23/2016     Priority: Medium     Alcohol abuse 09/23/2016     Priority: Medium     Herpes simplex type 1 infection 03/16/2016     Priority: Medium     Major depressive disorder, single episode, mild (H) 02/29/2016     Priority: Medium     Female infertility 03/02/2015     Priority: Medium     Infertility tests:  Baseline Labs-normal, including normal antimullerian hormone  Tubal Factors-HSG-normal  Male Factor-  Ovulation- luteal prog=7.6    Plan- offered Clomid ovulation enhancement       History of PID 03/02/2015     Priority: Medium     Recurrent vaginitis 04/04/2014     Priority: Medium     Alternates between yeast and BV  trimosan         Mittelschmerz 04/04/2014     Priority: Medium     CARDIOVASCULAR SCREENING; LDL GOAL LESS THAN 160 03/06/2013     Priority: Medium        Past Medical History:    Past Medical History:    Diagnosis Date     GERD (gastroesophageal reflux disease)        Past Surgical History:    Past Surgical History:   Procedure Laterality Date     MOUTH SURGERY      wisdom teeth       Family History:    Family History   Problem Relation Age of Onset     Hypertension Father        Social History:  Marital Status:  Single [1]  Social History     Tobacco Use     Smoking status: Current Every Day Smoker     Packs/day: 0.25     Years: 8.00     Pack years: 2.00     Types: Cigarettes     Last attempt to quit: 3/19/2014     Years since quittin.6     Smokeless tobacco: Never Used   Substance Use Topics     Alcohol use: No     Alcohol/week: 0.0 standard drinks     Drug use: Yes     Types: Methamphetamines        Medications:    clindamycin (CLEOCIN) 300 MG capsule  Aspirin-Acetaminophen-Caffeine (EXCEDRIN PO)  escitalopram (LEXAPRO) 10 MG tablet  hydrOXYzine (ATARAX) 25 MG tablet  IBUPROFEN PO  valACYclovir (VALTREX) 1000 mg tablet          Review of Systems   Constitutional: Negative for fever.   HENT: Positive for congestion, dental problem, rhinorrhea and sore throat. Negative for trouble swallowing and voice change.    Eyes: Negative.    Respiratory: Positive for cough. Negative for shortness of breath and wheezing.    Cardiovascular: Negative.    Gastrointestinal: Negative.    Musculoskeletal: Negative.    Skin: Negative.    Neurological: Negative.    All other systems reviewed and are negative.      Physical Exam   BP: 114/77  Pulse: 75  Temp: 98  F (36.7  C)  Resp: 18  SpO2: 100 %      Physical Exam  Vitals signs and nursing note reviewed.   Constitutional:       General: She is not in acute distress.     Appearance: Normal appearance. She is normal weight. She is not ill-appearing or toxic-appearing.   HENT:      Head: Normocephalic and atraumatic.      Right Ear: Tympanic membrane and ear canal normal.      Left Ear: Tympanic membrane and ear canal normal.      Nose: Nose normal.      Mouth/Throat:      Mouth:  Mucous membranes are moist.      Dentition: Abnormal dentition. Does not have dentures. Dental tenderness, gingival swelling and dental caries present. No dental abscesses.      Tongue: No lesions.      Pharynx: Oropharynx is clear. No oropharyngeal exudate or posterior oropharyngeal erythema.        Comments: Poor dentition throughout mouth with mild erythema to the gums. No active bleeding. No trismus, dysphonia or dysphagia. No facial swelling or erythema noted.   Eyes:      General:         Right eye: No discharge.         Left eye: No discharge.      Extraocular Movements: Extraocular movements intact.      Conjunctiva/sclera: Conjunctivae normal.      Pupils: Pupils are equal, round, and reactive to light.   Cardiovascular:      Rate and Rhythm: Normal rate and regular rhythm.   Pulmonary:      Effort: Pulmonary effort is normal.      Breath sounds: Normal breath sounds.   Skin:     General: Skin is warm.      Capillary Refill: Capillary refill takes less than 2 seconds.      Findings: No erythema or rash.   Neurological:      General: No focal deficit present.      Mental Status: She is alert and oriented to person, place, and time.   Psychiatric:         Mood and Affect: Mood normal.         Behavior: Behavior normal.         Thought Content: Thought content normal.         Judgment: Judgment normal.         ED Course        Procedures              Critical Care time:  none               No results found for this or any previous visit (from the past 24 hour(s)).    Medications - No data to display    Assessments & Plan (with Medical Decision Making)     I have reviewed the nursing notes.    I have reviewed the findings, diagnosis, plan and need for follow up with the patient.   29-year-old female presents today with dental pain for the past week and gum bleeding when brushing teeth for the past month.  See exam findings above.  Patient allergic to penicillin so she was placed on clindamycin 4 times daily for  10 days for treatment of dental infection.  No concerns at this time for dental abscess, Juan's angina or peritonsillar abscess.  No cellulitis appreciated.  Patient also with URI type symptoms on exam today.  Informed patient that at this time no concerns for bacterial etiology with regards to her URI type symptoms.  Symptomatic treatment discussed.  Patient follow-up with dentist as soon as possible for further evaluation and treatment and to return to the emergency department if symptoms worsen or change these were discussed with patient given discharge paperwork.  Patient discharged in stable condition.    Discharge Medication List as of 11/28/2019 11:55 AM          Final diagnoses:   Pain, dental   Gingivitis   Upper respiratory tract infection, unspecified type       11/28/2019   [facility] EMERGENCY DEPARTMENT     Jennifer Holman PA-C  11/28/19 7971

## 2019-11-28 NOTE — ED AVS SNAPSHOT
[facility] Emergency Department  [address] [facility] [address]  Phone:  [phone]  Fax:  [phone]                                    Shruthi Vega   MRN: [id number]    Department:  [facility] Emergency Department   Date of Visit:  11/28/2019           After Visit Summary Signature Page    I have received my discharge instructions, and my questions have been answered. I have discussed any challenges I see with this plan with the nurse or doctor.    ..........................................................................................................................................  Patient/Patient Representative Signature      ..........................................................................................................................................  Patient Representative Print Name and Relationship to Patient    ..................................................               ................................................  Date                                   Time    ..........................................................................................................................................  Reviewed by Signature/Title    ...................................................              ..............................................  Date                                               Time          22EPIC Rev 08/18

## 2019-12-06 ENCOUNTER — HOSPITAL ENCOUNTER (EMERGENCY)
Facility: CLINIC | Age: 29
Discharge: HOME | End: 2019-12-06
Attending: PHYSICIAN ASSISTANT | Admitting: PHYSICIAN ASSISTANT
Payer: COMMERCIAL

## 2019-12-06 VITALS
SYSTOLIC BLOOD PRESSURE: 145 MMHG | TEMPERATURE: 98 F | RESPIRATION RATE: 18 BRPM | WEIGHT: 134 LBS | BODY MASS INDEX: 21.03 KG/M2 | DIASTOLIC BLOOD PRESSURE: 80 MMHG | HEIGHT: 67 IN | HEART RATE: 79 BPM | OXYGEN SATURATION: 100 %

## 2019-12-06 DIAGNOSIS — N76.0 VAGINITIS AND VULVOVAGINITIS: ICD-10-CM

## 2019-12-06 LAB
HCG UR QL: NEGATIVE
SPECIMEN SOURCE: NORMAL
WET PREP SPEC: NORMAL

## 2019-12-06 PROCEDURE — 87210 SMEAR WET MOUNT SALINE/INK: CPT | Performed by: PHYSICIAN ASSISTANT

## 2019-12-06 PROCEDURE — G0463 HOSPITAL OUTPT CLINIC VISIT: HCPCS | Performed by: PHYSICIAN ASSISTANT

## 2019-12-06 PROCEDURE — 99214 OFFICE O/P EST MOD 30 MIN: CPT | Mod: Z6 | Performed by: PHYSICIAN ASSISTANT

## 2019-12-06 PROCEDURE — 81025 URINE PREGNANCY TEST: CPT | Performed by: PHYSICIAN ASSISTANT

## 2019-12-06 PROCEDURE — 87491 CHLMYD TRACH DNA AMP PROBE: CPT | Performed by: PHYSICIAN ASSISTANT

## 2019-12-06 PROCEDURE — 87591 N.GONORRHOEAE DNA AMP PROB: CPT | Performed by: PHYSICIAN ASSISTANT

## 2019-12-06 RX ORDER — FLUCONAZOLE 150 MG/1
TABLET ORAL
Qty: 2 TABLET | Refills: 0 | Status: SHIPPED | OUTPATIENT
Start: 2019-12-06 | End: 2019-12-09

## 2019-12-06 RX ORDER — VALACYCLOVIR HYDROCHLORIDE 1 G/1
1000 TABLET, FILM COATED ORAL 2 TIMES DAILY
Qty: 20 TABLET | Refills: 0 | Status: SHIPPED | OUTPATIENT
Start: 2019-12-06 | End: 2019-12-16

## 2019-12-06 ASSESSMENT — MIFFLIN-ST. JEOR: SCORE: 1355.45

## 2019-12-06 NOTE — ED AVS SNAPSHOT
[facility] Emergency Department  [address] [address]  Phone:  [phone]  Fax:  [phone]                                    Shruthi Vega   MRN: [id number]    Department:  [facility] Emergency Department   Date of Visit:  12/6/2019           After Visit Summary Signature Page    I have received my discharge instructions, and my questions have been answered. I have discussed any challenges I see with this plan with the nurse or doctor.    ..........................................................................................................................................  Patient/Patient Representative Signature      ..........................................................................................................................................  Patient Representative Print Name and Relationship to Patient    ..................................................               ................................................  Date                                   Time    ..........................................................................................................................................  Reviewed by Signature/Title    ...................................................              ..............................................  Date                                               Time          22EPIC Rev 08/18

## 2019-12-06 NOTE — ED PROVIDER NOTES
History     Chief Complaint   Patient presents with     Vaginitis     ? yeast infx.  pt on anbx for dental infx     HPI  Shruthi Vega is a 29 year old female who presents to the urgent care with concern over possible yeast infection.  Beginning last 2 to 3 days patient has complained itching in the vulvovaginal region.  She denies any significant fever, chills, allergies, cough, dyspnea, wheezing, nausea, vomiting, abdominal pain, dysuria, hematuria, urinary frequency urgency.  She has not had any vaginal discharge, bleeding or spotting.  She is unaware of the date of her last menstrual period but states it was approximately 3 weeks ago.  She notes that she is currently on clindamycin for a dental infection.  She also notes that she does have a history of HSV.  She had been on medications for herpes but discontinued within the last month as she ran out.      Allergies:  Allergies   Allergen Reactions     Amoxicillin Other (See Comments)     Flu-like symptoms     Doxycycline Other (See Comments)     Flu-like symptoms       Problem List:    Patient Active Problem List    Diagnosis Date Noted     Anxiety 09/23/2016     Priority: Medium     Alcohol abuse 09/23/2016     Priority: Medium     Herpes simplex type 1 infection 03/16/2016     Priority: Medium     Major depressive disorder, single episode, mild (H) 02/29/2016     Priority: Medium     Female infertility 03/02/2015     Priority: Medium     Infertility tests:  Baseline Labs-normal, including normal antimullerian hormone  Tubal Factors-HSG-normal  Male Factor-  Ovulation- luteal prog=7.6    Plan- offered Clomid ovulation enhancement       History of PID 03/02/2015     Priority: Medium     Recurrent vaginitis 04/04/2014     Priority: Medium     Alternates between yeast and BV  trimosan         Mitt[place]iez 04/04/2014     Priority: Medium     CARDIOVASCULAR SCREENING; LDL GOAL LESS THAN 160 03/06/2013     Priority: Medium        Past Medical History:    Past  "Medical History:   Diagnosis Date     GERD (gastroesophageal reflux disease)        Past Surgical History:    Past Surgical History:   Procedure Laterality Date     MOUTH SURGERY      wisdom teeth       Family History:    Family History   Problem Relation Age of Onset     Hypertension Father        Social History:  Marital Status:  Single [1]  Social History     Tobacco Use     Smoking status: Current Every Day Smoker     Packs/day: 0.25     Years: 8.00     Pack years: 2.00     Types: Cigarettes     Last attempt to quit: 3/19/2014     Years since quittin.7     Smokeless tobacco: Never Used   Substance Use Topics     Alcohol use: No     Alcohol/week: 0.0 standard drinks     Drug use: Yes     Types: Methamphetamines        Medications:    Aspirin-Acetaminophen-Caffeine (EXCEDRIN PO)  clindamycin (CLEOCIN) 300 MG capsule  escitalopram (LEXAPRO) 10 MG tablet  hydrOXYzine (ATARAX) 25 MG tablet  IBUPROFEN PO  valACYclovir (VALTREX) 1000 mg tablet      Review of Systems  CONSTITUTIONAL:NEGATIVE for fever, chills, change in weight  INTEGUMENTARY/SKIN: NEGATIVE for worrisome rashes, moles or lesions  RESP:NEGATIVE for significant cough or SOB  GI: NEGATIVE for nausea, vomiting, diarrhea, abdominal pain   : POSITIVE for vaginal itching NEGATIVE for dysuria, increased frequency, urgency, hematuria, vaginal discharge   Physical Exam   BP: (!) 145/80  Pulse: 79  Temp: 98  F (36.7  C)  Resp: 18  Height: 170.2 cm (5' 7\")  Weight: 60.8 kg (134 lb)  SpO2: 100 %  GPhysical Exam  ENERAL APPEARANCE: healthy, alert and no distress  RESP: lungs clear to auscultation - no rales, rhonchi or wheezes  CV: regular rates and rhythm, normal S1 S2, no murmur noted  ABDOMEN:  soft, nontender, no HSM or masses and bowel sounds normal  BACK: No CVA tenderness  GU_female: Urge small ulceration versus excoriation to the left side of the perineum. No eternal erythema, thick white creamy vaginal discharge present.  The cervix is somewhat friable " without tenderness to palpation, no CMT.   SKIN: no suspicious lesions or rashes other than lesion near genitals as described above.  ED Course        Procedures        Critical Care time:  none        Results for orders placed or performed during the hospital encounter of 12/06/19   HCG qualitative urine     Status: None   Result Value Ref Range    HCG Qual Urine Negative NEG^Negative   Wet prep     Status: None   Result Value Ref Range    Specimen Description Vagina     Wet Prep No yeast seen     Wet Prep No clue cells seen     Wet Prep No Trichomonas seen     Wet Prep Few  WBC'S seen      Neisseria gonorrhoea PCR     Status: None   Result Value Ref Range    Specimen Descrip Vagina     N Gonorrhea PCR Negative NEG^Negative   Chlamydia trachomatis PCR     Status: None   Result Value Ref Range    Specimen Description Vagina     Chlamydia Trachomatis PCR Negative NEG^Negative     Medications - No data to display    Assessments & Plan (with Medical Decision Making)     I have reviewed the nursing notes.    I have reviewed the findings, diagnosis, plan and need for follow up with the patient.       Discharge Medication List as of 12/6/2019  1:20 PM      START taking these medications    Details   fluconazole (DIFLUCAN) 150 MG tablet Take one tablet now, and one tablet in three days, Disp-2 tablet, R-0, E-Prescribe      !! valACYclovir (VALTREX) 1000 mg tablet Take 1 tablet (1,000 mg) by mouth 2 times daily for 10 days, Disp-20 tablet, R-0, E-Prescribe       !! - Potential duplicate medications found. Please discuss with provider.        Final diagnoses:   Vaginitis and vulvovaginitis     29-year-old female presents now with concern over 3-day history of itching in the vulvovaginal region.  She had stable vital signs upon arrival.  Physical exam findings as described above were significant for thick white creamy discharge in the vagina.  No CVA tenderness to palpation.  There were ulcerations vs. Excoriations in the  perineal region on the left side.  I consider possibility of herpes outbreak given her past medical history.  As part of evaluation patient had wet prep which was negative for yeast, clue cells, trichomonas, white blood cells seen.  Testing for chlamydia and gonorrhea pending at time of discharge.  Her clinical history, physical exam is suspicious for yeast infection I did discuss risk/benefits of treating patient empirically despite negative wet prep and she elected to start.  We also discussed risks benefits of medications for HSV outbreak and patient elected to initiate.  She was discharged home stable with prescription for Diflucan, Valtrex.  Follow-up with primary care provider if no improvement within the next 3 days.  Worrisome reasons to return to ER/UC sooner discussed.    Disclaimer: This note consists of symbols derived from keyboarding, dictation, and/or voice recognition software. As a result, there may be errors in the script that have gone undetected.  Please consider this when interpreting information found in the chart.        12/6/2019   [facility] EMERGENCY DEPARTMENT     Sophia Duval PA-C  12/08/19 2095

## 2019-12-07 LAB
C TRACH DNA SPEC QL NAA+PROBE: NEGATIVE
N GONORRHOEA DNA SPEC QL NAA+PROBE: NEGATIVE
SPECIMEN SOURCE: NORMAL
SPECIMEN SOURCE: NORMAL

## 2020-03-15 ENCOUNTER — HEALTH MAINTENANCE LETTER (OUTPATIENT)
Age: 30
End: 2020-03-15

## 2020-07-24 NOTE — NURSING NOTE
"Chief Complaint   Patient presents with     Vaginal Problem     check for yeast infection       Initial /72 (BP Location: Left arm, Patient Position: Chair, Cuff Size: Adult Regular)  Pulse 80  Temp 99.3  F (37.4  C) (Temporal)  Ht 5' 7\" (1.702 m)  Wt 120 lb 2 oz (54.5 kg)  LMP 02/28/2018  SpO2 100%  BMI 18.81 kg/m2 Estimated body mass index is 18.81 kg/(m^2) as calculated from the following:    Height as of this encounter: 5' 7\" (1.702 m).    Weight as of this encounter: 120 lb 2 oz (54.5 kg).  Medication Reconciliation: alvin Mckenna CMA      "
206.1 pounds; defer fluid needs to team at this time/current

## 2020-10-10 ENCOUNTER — HOSPITAL ENCOUNTER (EMERGENCY)
Facility: CLINIC | Age: 30
Discharge: HOME | End: 2020-10-10
Attending: PHYSICIAN ASSISTANT | Admitting: PHYSICIAN ASSISTANT
Payer: COMMERCIAL

## 2020-10-10 VITALS
OXYGEN SATURATION: 99 % | WEIGHT: 125 LBS | TEMPERATURE: 97.9 F | RESPIRATION RATE: 16 BRPM | BODY MASS INDEX: 19.58 KG/M2 | SYSTOLIC BLOOD PRESSURE: 124 MMHG | DIASTOLIC BLOOD PRESSURE: 78 MMHG

## 2020-10-10 DIAGNOSIS — R23.8 SKIN PIMPLE: ICD-10-CM

## 2020-10-10 PROCEDURE — 99213 OFFICE O/P EST LOW 20 MIN: CPT | Performed by: PHYSICIAN ASSISTANT

## 2020-10-10 PROCEDURE — U0003 INFECTIOUS AGENT DETECTION BY NUCLEIC ACID (DNA OR RNA); SEVERE ACUTE RESPIRATORY SYNDROME CORONAVIRUS 2 (SARS-COV-2) (CORONAVIRUS DISEASE [COVID-19]), AMPLIFIED PROBE TECHNIQUE, MAKING USE OF HIGH THROUGHPUT TECHNOLOGIES AS DESCRIBED BY CMS-2020-01-R: HCPCS | Performed by: PHYSICIAN ASSISTANT

## 2020-10-10 PROCEDURE — C9803 HOPD COVID-19 SPEC COLLECT: HCPCS

## 2020-10-10 PROCEDURE — G0463 HOSPITAL OUTPT CLINIC VISIT: HCPCS | Performed by: PHYSICIAN ASSISTANT

## 2020-10-10 NOTE — ED PROVIDER NOTES
History     Chief Complaint   Patient presents with     Otalgia     right ear pain X1 day ago.      HPI  Shruthi Vega is a 30 year old female who presents to the urgent care with concern over right ear pain her son present for the last 2 days.  She additionally states that she has had ongoing nasal congestion, rare infrequent cough and has developed a sore throat in the last 24 hours.  She denies any recent fever, chills, allergies, dyspnea, wheezing, vomiting, diarrhea or abdominal complaints.  She does request testing for COVID-19.  She denies any known exposures with COVID-19.  She denies any history of frequent or recurrent otitis media or otitis externa infections.    Allergies:  Allergies   Allergen Reactions     Amoxicillin Other (See Comments)     Flu-like symptoms     Doxycycline Other (See Comments)     Flu-like symptoms       Problem List:    Patient Active Problem List    Diagnosis Date Noted     Anxiety 09/23/2016     Priority: Medium     Alcohol abuse 09/23/2016     Priority: Medium     Herpes simplex type 1 infection 03/16/2016     Priority: Medium     Major depressive disorder, single episode, mild (H) 02/29/2016     Priority: Medium     Female infertility 03/02/2015     Priority: Medium     Infertility tests:  Baseline Labs-normal, including normal antimullerian hormone  Tubal Factors-HSG-normal  Male Factor-  Ovulation- luteal prog=7.6    Plan- offered Clomid ovulation enhancement       History of PID 03/02/2015     Priority: Medium     Recurrent vaginitis 04/04/2014     Priority: Medium     Alternates between yeast and BV  trimosan         Mitt[place]merz 04/04/2014     Priority: Medium     CARDIOVASCULAR SCREENING; LDL GOAL LESS THAN 160 03/06/2013     Priority: Medium        Past Medical History:    Past Medical History:   Diagnosis Date     GERD (gastroesophageal reflux disease)        Past Surgical History:    Past Surgical History:   Procedure Laterality Date     MOUTH SURGERY      navid  teeth       Family History:    Family History   Problem Relation Age of Onset     Hypertension Father        Social History:  Marital Status:  Single [1]  Social History     Tobacco Use     Smoking status: Current Every Day Smoker     Packs/day: 0.25     Years: 8.00     Pack years: 2.00     Types: Cigarettes     Last attempt to quit: 3/19/2014     Years since quittin.5     Smokeless tobacco: Never Used   Substance Use Topics     Alcohol use: No     Alcohol/week: 0.0 standard drinks     Drug use: Yes     Types: Methamphetamines        Medications:         Aspirin-Acetaminophen-Caffeine (EXCEDRIN PO)       escitalopram (LEXAPRO) 10 MG tablet       hydrOXYzine (ATARAX) 25 MG tablet       IBUPROFEN PO       valACYclovir (VALTREX) 1000 mg tablet       valACYclovir (VALTREX) 1000 mg tablet      Review of Systems  CONSTITUTIONAL:NEGATIVE for fever, chills, change in weight  INTEGUMENTARY/SKIN: NEGATIVE for worrisome rashes, moles or lesions  EYES: NEGATIVE for vision changes or irritation  ENT/MOUTH: POSITIVE for right ear pain, nasal congestion, minimal sore throat  RESP:POSITIVE for rare infrequent cough NEGATIVE for dyspnea, wheezing   GI: NEGATIVE for nausea, vomiting, diarrhea, abdominal pain   Physical Exam   BP: 124/78  Temp: 97.9  F (36.6  C)  Resp: 16  Weight: 56.7 kg (125 lb)  SpO2: 99 %  Physical Exam   The right TM is normal: no effusions, no erythema, and normal landmarks     The right auditory canal has localized erythematous tender swollen pustule on the posterior wall of the canal, no generalized erythema or discharge noted.  No tenderness palpation of the pinna or tragus  The left TM is normal: no effusions, no erythema, and normal landmarks  The left auditory canal is normal and without drainage, edema or erythema  Oropharynx exam is normal: no lesions, erythema, adenopathy or exudate.  GENERAL: no acute distress  EYES: EOMI,  PERRL, conjunctiva clear  NECK: supple, non-tender to palpation, no  adenopathy noted  RESP: lungs clear to auscultation - no rales, rhonchi or wheezes  CV: regular rates and rhythm, normal S1 S2, no murmur noted  SKIN: no suspicious lesions or rashes   ED Course        Procedures        Critical Care time:  none        No results found for this or any previous visit (from the past 24 hour(s)).    Medications - No data to display    Assessments & Plan (with Medical Decision Making)     I have reviewed the nursing notes.  I have reviewed the findings, diagnosis, plan and need for follow up with the patient.       New Prescriptions    No medications on file     Final diagnoses:   Skin pimple     30-year-old female presents to the urgent care with concern over right ear pain for the last 2 days.  She had stable vital signs upon arrival.  Sickle exam findings as described above appear most consistent with a pimple of the right ear canal.  I have low concern for otitis externa at this time however this would remain on the differential.  Differential would also include eustachian tube dysfunction.  No evidence of otitis media, mastoiditis.  She was discharged home stable with instructions for symptomatic treatment with warm compresses, tylenol/ibuprofen as needed for pain.  Given patient request testing for COVID-19 was ordered and pending at time of discharge.   Follow up with PCP if no improvement in 5-7 days.  Worrisome reasons to return to ER/UC sooner discussed.     Disclaimer: This note consists of symbols derived from keyboarding, dictation, and/or voice recognition software. As a result, there may be errors in the script that have gone undetected.  Please consider this when interpreting information found in the chart.      10/10/2020   [facility] EMERGENCY DEPT     Sophia Duval PA-C  10/10/20 [id number]

## 2020-10-10 NOTE — ED AVS SNAPSHOT
[facility] Emergency Dept  [address] [address]  Phone: [phone]  Fax: [phone]                                    Shruthi Vega   MRN: [id number]    Department: [facility] Emergency Dept   Date of Visit: 10/10/2020           After Visit Summary Signature Page    I have received my discharge instructions, and my questions have been answered. I have discussed any challenges I see with this plan with the nurse or doctor.    ..........................................................................................................................................  Patient/Patient Representative Signature      ..........................................................................................................................................  Patient Representative Print Name and Relationship to Patient    ..................................................               ................................................  Date                                   Time    ..........................................................................................................................................  Reviewed by Signature/Title    ...................................................              ..............................................  Date                                               Time          22EPIC Rev 08/18

## 2020-10-11 LAB
SARS-COV-2 RNA SPEC QL NAA+PROBE: NOT DETECTED
SPECIMEN SOURCE: NORMAL

## 2020-12-17 ENCOUNTER — HOSPITAL ENCOUNTER (EMERGENCY)
Facility: CLINIC | Age: 30
Discharge: HOME | End: 2020-12-17
Attending: PHYSICIAN ASSISTANT | Admitting: PHYSICIAN ASSISTANT
Payer: COMMERCIAL

## 2020-12-17 VITALS
WEIGHT: 130 LBS | DIASTOLIC BLOOD PRESSURE: 87 MMHG | OXYGEN SATURATION: 99 % | SYSTOLIC BLOOD PRESSURE: 135 MMHG | RESPIRATION RATE: 16 BRPM | TEMPERATURE: 97.7 F | BODY MASS INDEX: 20.36 KG/M2

## 2020-12-17 DIAGNOSIS — L03.031 CELLULITIS OF GREAT TOE OF RIGHT FOOT: ICD-10-CM

## 2020-12-17 PROCEDURE — 99282 EMERGENCY DEPT VISIT SF MDM: CPT | Mod: 25 | Performed by: PHYSICIAN ASSISTANT

## 2020-12-17 PROCEDURE — 10060 I&D ABSCESS SIMPLE/SINGLE: CPT | Performed by: PHYSICIAN ASSISTANT

## 2020-12-17 PROCEDURE — 99284 EMERGENCY DEPT VISIT MOD MDM: CPT | Mod: 25 | Performed by: PHYSICIAN ASSISTANT

## 2020-12-17 RX ORDER — CEPHALEXIN 500 MG/1
500 CAPSULE ORAL 4 TIMES DAILY
Qty: 28 CAPSULE | Refills: 0 | Status: SHIPPED | OUTPATIENT
Start: 2020-12-17 | End: 2020-12-24

## 2020-12-17 NOTE — ED AVS SNAPSHOT
[facility] Emergency Dept  [address] [address]  Phone: [phone]  Fax: [phone]                                    Shruthi Vega   MRN: [id number]    Department: [facility] Emergency Dept   Date of Visit: 12/17/2020           After Visit Summary Signature Page    I have received my discharge instructions, and my questions have been answered. I have discussed any challenges I see with this plan with the nurse or doctor.    ..........................................................................................................................................  Patient/Patient Representative Signature      ..........................................................................................................................................  Patient Representative Print Name and Relationship to Patient    ..................................................               ................................................  Date                                   Time    ..........................................................................................................................................  Reviewed by Signature/Title    ...................................................              ..............................................  Date                                               Time          22EPIC Rev 08/18

## 2020-12-17 NOTE — ED PROVIDER NOTES
History     Chief Complaint   Patient presents with     Toe Pain     great toe pain x 2 wks     HPI  Shruthi Vega is a 30 year old female who presents to the urgent care with concern over right great toe pain which been present for approximately last 2 weeks.  Patient denies any significant instigating injury or trauma but states that she may have had a hangnail couple weeks ago.  She has noted some erythema, swelling.  She denies any discharge or drainage from the wound.  No distal numbness or paresthesias.  She does not have any fever, chills, allergies, cough, chest pain, dyspnea, wheezing, numbness or paresthesias.  She has attempted to treat with Epson salts and topical antibiotic ointment without improvement.  She denies any prior history of MRSA.      Allergies:  Allergies   Allergen Reactions     Amoxicillin Other (See Comments)     Flu-like symptoms     Doxycycline Other (See Comments)     Flu-like symptoms     Problem List:    Patient Active Problem List    Diagnosis Date Noted     Anxiety 09/23/2016     Priority: Medium     Alcohol abuse 09/23/2016     Priority: Medium     Herpes simplex type 1 infection 03/16/2016     Priority: Medium     Major depressive disorder, single episode, mild (H) 02/29/2016     Priority: Medium     Female infertility 03/02/2015     Priority: Medium     Infertility tests:  Baseline Labs-normal, including normal antimullerian hormone  Tubal Factors-HSG-normal  Male Factor-  Ovulation- luteal prog=7.6    Plan- offered Clomid ovulation enhancement       History of PID 03/02/2015     Priority: Medium     Recurrent vaginitis 04/04/2014     Priority: Medium     Alternates between yeast and BV  trimosan         Mittelschmerz 04/04/2014     Priority: Medium     CARDIOVASCULAR SCREENING; LDL GOAL LESS THAN 160 03/06/2013     Priority: Medium      Past Medical History:    Past Medical History:   Diagnosis Date     GERD (gastroesophageal reflux disease)      Past Surgical History:     Past Surgical History:   Procedure Laterality Date     MOUTH SURGERY      wisdom teeth     Family History:    Family History   Problem Relation Age of Onset     Hypertension Father      Social History:  Marital Status:  Single [1]  Social History     Tobacco Use     Smoking status: Current Every Day Smoker     Packs/day: 0.25     Years: 8.00     Pack years: 2.00     Types: Cigarettes     Last attempt to quit: 3/19/2014     Years since quittin.7     Smokeless tobacco: Never Used   Substance Use Topics     Alcohol use: No     Alcohol/week: 0.0 standard drinks     Drug use: Yes     Types: Methamphetamines      Medications:         cephALEXin (KEFLEX) 500 MG capsule       Aspirin-Acetaminophen-Caffeine (EXCEDRIN PO)       escitalopram (LEXAPRO) 10 MG tablet       hydrOXYzine (ATARAX) 25 MG tablet       IBUPROFEN PO       valACYclovir (VALTREX) 1000 mg tablet       valACYclovir (VALTREX) 1000 mg tablet      Review of Systems  CONSTITUTIONAL:NEGATIVE for fever, chills, change in weight  INTEGUMENTARY/SKIN: POSITIVE for right great toe erythema NEGATIVE for other worrisome rashes or lesions   RESP:NEGATIVE for significant cough or SOB  MUSCULOSKELETAL: POSITIVE  for right great toe pain and swelling and NEGATIVE for other concerning arthralgias or myalgias   NEURO: NEGATIVE for numbness, weakness, paresthesias   Physical Exam   BP: 135/87  Temp: 97.7  F (36.5  C)  Resp: 16  Weight: 59 kg (130 lb)  SpO2: 99 %    Physical Exam  HENT:      Head: Normocephalic and atraumatic.   Cardiovascular:      Rate and Rhythm: Normal rate.      Heart sounds: No murmur. No friction rub. No gallop.    Pulmonary:      Effort: Pulmonary effort is normal. No respiratory distress.      Breath sounds: Normal breath sounds. No wheezing, rhonchi or rales.   Musculoskeletal:      Right ankle: Normal.      Right foot: Normal range of motion. Tenderness and swelling present. No bony tenderness.        Feet:    Skin:     General: Skin is warm  and dry.      Findings: Erythema (of distal half of great toe) present. No ecchymosis, laceration or rash.   Neurological:      Mental Status: She is alert.      Sensory: No sensory deficit.       ED [facility]     PROCEDURE: -Incision/Drainage    Date/Time: 12/17/2020 10:35 AM  Performed by: Sophia Duval PA-C  Authorized by: Sophia Duval PA-C       LOCATION:      Indications for incision and drainage: paronychia.    ANESTHESIA (see MAR for exact dosages):     Anesthesia method:  None    PROCEDURE DETAILS:     Incision type: 18 gauge needle.    Incision depth:  Dermal    Drainage:  Bloody    Drainage amount:  Scant  Post-procedure details:     PROCEDURE   Patient Tolerance:  Patient tolerated the procedure well with no immediate complications              Critical Care time:  none        No results found for this or any previous visit (from the past 24 hour(s)).  Medications - No data to display    Assessments & Plan (with Medical Decision Making)     I have reviewed the nursing notes.  I have reviewed the findings, diagnosis, plan and need for follow up with the patient.       Discharge Medication List as of 12/17/2020  9:36 AM      START taking these medications    Details   cephALEXin (KEFLEX) 500 MG capsule Take 1 capsule (500 mg) by mouth 4 times daily for 7 days, Disp-28 capsule, R-0, E-Prescribe           Final diagnoses:   Cellulitis of great toe of right foot     30-year-old female presents to the emergency department with concern over 2-week history of right great toe erythema, swelling.  She had stable vital signs upon arrival.  Physical exam findings as described above.  Is consistent with cellulitis of the right great toe possibly secondary to recent paronychia.  Did attempt incision and drainage of lesion and attempted only a small amount of bloody discharge.  She was discharged home stable with instructions for continued warm soaks, prescription for  Keflex given.  Follow-up with primary care provider if no improvement within the next 48 to 72 hours.  Worrisome reasons to return to the ER/UC sooner discussed.    Disclaimer: This note consists of symbols derived from keyboarding, dictation, and/or voice recognition software. As a result, there may be errors in the script that have gone undetected.  Please consider this when interpreting information found in the chart.    12/17/2020   [facility] EMERGENCY DEPT     Sophia Duval PA-C  12/17/20 1037

## 2020-12-17 NOTE — ED NOTES
Pt presents to ED with concerns of infection/pain in big toe of right foot; states it has been getting worse over the past few weeks. Denies trauma, no fevers. May have had a hangnail a few weeks back. Tried soaking in epsom salt, antibiotic ointment. No improvement. Distal half of big toe appears reddened and swollen on the top.

## 2021-02-10 ENCOUNTER — HOSPITAL ENCOUNTER (EMERGENCY)
Facility: CLINIC | Age: 31
Discharge: HOME | End: 2021-02-10
Attending: PHYSICIAN ASSISTANT | Admitting: PHYSICIAN ASSISTANT
Payer: COMMERCIAL

## 2021-02-10 VITALS
HEIGHT: 67 IN | WEIGHT: 130 LBS | DIASTOLIC BLOOD PRESSURE: 96 MMHG | RESPIRATION RATE: 20 BRPM | HEART RATE: 88 BPM | SYSTOLIC BLOOD PRESSURE: 142 MMHG | TEMPERATURE: 98.5 F | BODY MASS INDEX: 20.4 KG/M2 | OXYGEN SATURATION: 100 %

## 2021-02-10 DIAGNOSIS — F15.20 METHAMPHETAMINE ADDICTION (H): ICD-10-CM

## 2021-02-10 DIAGNOSIS — L02.413 ABSCESS OF RIGHT FOREARM: ICD-10-CM

## 2021-02-10 PROCEDURE — 87070 CULTURE OTHR SPECIMN AEROBIC: CPT | Performed by: PHYSICIAN ASSISTANT

## 2021-02-10 PROCEDURE — 99284 EMERGENCY DEPT VISIT MOD MDM: CPT | Mod: 25 | Performed by: PHYSICIAN ASSISTANT

## 2021-02-10 PROCEDURE — 87186 SC STD MICRODIL/AGAR DIL: CPT | Performed by: PHYSICIAN ASSISTANT

## 2021-02-10 PROCEDURE — 99283 EMERGENCY DEPT VISIT LOW MDM: CPT | Mod: 25 | Performed by: PHYSICIAN ASSISTANT

## 2021-02-10 PROCEDURE — 87147 CULTURE TYPE IMMUNOLOGIC: CPT | Performed by: PHYSICIAN ASSISTANT

## 2021-02-10 PROCEDURE — 87077 CULTURE AEROBIC IDENTIFY: CPT | Performed by: PHYSICIAN ASSISTANT

## 2021-02-10 PROCEDURE — 10060 I&D ABSCESS SIMPLE/SINGLE: CPT | Performed by: PHYSICIAN ASSISTANT

## 2021-02-10 RX ORDER — SULFAMETHOXAZOLE/TRIMETHOPRIM 800-160 MG
1 TABLET ORAL 2 TIMES DAILY
Qty: 14 TABLET | Refills: 0 | Status: SHIPPED | OUTPATIENT
Start: 2021-02-10 | End: 2021-02-17

## 2021-02-10 ASSESSMENT — MIFFLIN-ST. JEOR: SCORE: 1327.31

## 2021-02-10 ASSESSMENT — ENCOUNTER SYMPTOMS
FEVER: 0
VOMITING: 0
GASTROINTESTINAL NEGATIVE: 1
CONSTITUTIONAL NEGATIVE: 1

## 2021-02-10 NOTE — ED PROVIDER NOTES
History     Chief Complaint   Patient presents with     Cyst     Right lower forarme, pt reports she is an IV drug user, pt reports last time she used was 3 days ago. Pt reports she noticed some redness to area about 1 week ago, pt reports developed a hard lump with pain, pt denies fevers at this time.      HPI  Shruthi Vega is a 31 year old female who presents with complaints of abscess to right forearm.  Patient has history of methamphetamine abuse and IV drug use.  She states after injecting meth into this portion of her forearm, she subsequently developed a hard lump to the area that has become progressively more red, swollen, and painful.  Patient denies history of similar symptoms in the past.  She denies any systemic symptoms.  Denies fevers, chills, nausea, or vomiting.      Allergies:  Allergies   Allergen Reactions     Amoxicillin Other (See Comments)     Flu-like symptoms     Doxycycline Other (See Comments)     Flu-like symptoms       Problem List:    Patient Active Problem List    Diagnosis Date Noted     Anxiety 09/23/2016     Priority: Medium     Alcohol abuse 09/23/2016     Priority: Medium     Herpes simplex type 1 infection 03/16/2016     Priority: Medium     Major depressive disorder, single episode, mild (H) 02/29/2016     Priority: Medium     Female infertility 03/02/2015     Priority: Medium     Infertility tests:  Baseline Labs-normal, including normal antimullerian hormone  Tubal Factors-HSG-normal  Male Factor-  Ovulation- luteal prog=7.6    Plan- offered Clomid ovulation enhancement       History of PID 03/02/2015     Priority: Medium     Recurrent vaginitis 04/04/2014     Priority: Medium     Alternates between yeast and BV  trimosan         Mittelschmerz 04/04/2014     Priority: Medium     CARDIOVASCULAR SCREENING; LDL GOAL LESS THAN 160 03/06/2013     Priority: Medium        Past Medical History:    Past Medical History:   Diagnosis Date     GERD (gastroesophageal reflux disease)   "      Past Surgical History:    Past Surgical History:   Procedure Laterality Date     MOUTH SURGERY      wisdom teeth       Family History:    Family History   Problem Relation Age of Onset     Hypertension Father        Social History:  Marital Status:  Single [1]  Social History     Tobacco Use     Smoking status: Current Every Day Smoker     Packs/day: 0.25     Years: 8.00     Pack years: 2.00     Types: Cigarettes     Last attempt to quit: 3/19/2014     Years since quittin.9     Smokeless tobacco: Never Used   Substance Use Topics     Alcohol use: No     Alcohol/week: 0.0 standard drinks     Drug use: Yes     Types: Methamphetamines        Medications:         sulfamethoxazole-trimethoprim (BACTRIM DS) 800-160 MG tablet       Aspirin-Acetaminophen-Caffeine (EXCEDRIN PO)       escitalopram (LEXAPRO) 10 MG tablet       hydrOXYzine (ATARAX) 25 MG tablet       IBUPROFEN PO       valACYclovir (VALTREX) 1000 mg tablet       valACYclovir (VALTREX) 1000 mg tablet          Review of Systems   Constitutional: Negative.  Negative for fever.   Gastrointestinal: Negative.  Negative for vomiting.   Skin:        Abscess to right forearm   All other systems reviewed and are negative.      Physical Exam   BP: (!) 142/96  Pulse: 88  Temp: 98.5  F (36.9  C)  Resp: 20  Height: 170.2 cm (5' 7\")  Weight: 59 kg (130 lb)  SpO2: 100 %      Physical Exam  Constitutional:       General: She is not in acute distress.     Appearance: Normal appearance. She is not ill-appearing, toxic-appearing or diaphoretic.   HENT:      Head: Normocephalic and atraumatic.   Cardiovascular:      Pulses: Normal pulses.   Pulmonary:      Effort: Pulmonary effort is normal.   Musculoskeletal: Normal range of motion.      Right forearm: She exhibits tenderness and swelling.      Comments: Swelling, erythema, tenderness, induration, and central fluctuance to right ulnar forearm.  No drainage from the area.  No associated swelling or tenderness to the " remainder of the right lower arm.   Skin:     General: Skin is warm and dry.      Capillary Refill: Capillary refill takes less than 2 seconds.   Neurological:      General: No focal deficit present.      Mental Status: She is alert.      Sensory: Sensation is intact.      Motor: Motor function is intact.         ED [facility]    PROCEDURE: -Incision/Drainage    Date/Time: 2/10/2021 12:02 PM  Performed by: Rand Barnes PA-C  Authorized by: Rand Barnes PA-C       LOCATION:      Type:  Abscess    Location:  Upper extremity    Upper extremity location:  Arm    Arm location:  R lower arm    PRE-PROCEDURE DETAILS:     Skin preparation:  Chloraprep    ANESTHESIA (see MAR for exact dosages):     Anesthesia method:  Local infiltration    Local anesthetic:  Lidocaine 1% WITH epi    PROCEDURE TYPE:     Complexity:  Simple    PROCEDURE DETAILS:     Incision types:  Stab incision    Scalpel blade:  11    Wound management:  Probed and deloculated and irrigated with saline    Drainage:  Purulent    Drainage amount:  Moderate    Wound treatment:  Wound left open    Packing materials:  None  Post-procedure details:     PROCEDURE   Patient Tolerance:  Patient tolerated the procedure well with no immediate complications          No results found for this or any previous visit (from the past 24 hour(s)).    Medications - No data to display    Assessments & Plan (with Medical Decision Making)     Pt is a 31 year old female who presents with complaints of abscess to right forearm.  Patient has history of methamphetamine abuse and IV drug use.  She states after injecting meth into this portion of her forearm, she subsequently developed a hard lump to the area that has become progressively more red, swollen, and painful.  Patient denies any systemic symptoms.    Pt is afebrile on arrival.  Exam as above.  Abscess with I&D (see above procedure note).  Will place on antibiotics for  treatment of associated cellulitis.  Wound culture is pending.  Will start patient on antibiotics with MRSA coverage.  Encouraged continued warm compresses to the area.  Discussed patient's history of drug abuse and patient expresses interest in quitting and would like information for possible outpatient CD treatment options.  This was therefore provided for patient and I also placed a referral for this as well to help facilitate follow-up.  Return precautions were reviewed.  Hand-outs were provided.    Patient was sent with Bactrim and was instructed to follow-up with PCP in 3-5 days for continued care and management or sooner if new or worsening symptoms.  She is to return to the ED for persistent and/or worsening symptoms.  Patient expressed understanding of the diagnosis and plan and was discharged home in good condition.    I have reviewed the nursing notes.    I have reviewed the findings, diagnosis, plan and need for follow up with the patient.    Discharge Medication List as of 2/10/2021 11:12 AM      START taking these medications    Details   sulfamethoxazole-trimethoprim (BACTRIM DS) 800-160 MG tablet Take 1 tablet by mouth 2 times daily for 7 days, Disp-14 tablet, R-0, E-Prescribe             Final diagnoses:   Abscess of right forearm   Methamphetamine addiction (H)       2/10/2021   [facility] EMERGENCY DEPT      Disclaimer:  This note consists of symbols derived from keyboarding, dictation and/or voice recognition software.  As a result, there may be errors in the script that have gone undetected.  Please consider this when interpreting information found in this chart.     Rand Barnes PA-C  02/10/21 1257

## 2021-02-10 NOTE — RESULT ENCOUNTER NOTE
[facility] ED discharge antibiotic (if prescribed):  Sulfamethoxazole-Trimethoprim (Bactrim DS, Septra DS) 800-160 mg PO tablet,  1 tablet by mouth 2 times daily for 10 days.  Incision and Drainage performed in [facility] ED [Yes / No] : Yes  No changes in treatment per ED lab result protocol.

## 2021-02-10 NOTE — DISCHARGE INSTRUCTIONS
Behavioral/Mental Health Resources    [place], [place], [place], [place], [place], [place], [place], [place] and [facility]    **Patient should check with their health insurance to identify providers in network**    Crisis Lines:  -[place]: [place] [facility]: [phone]) [phone]  -[place]: [phone]  -[place]/ [place]/ [place]/ [place]/ [place]: [phone]  -[place]: [facility] Crisis: [phone]  -[place] and [facility]: [facility] Crisis Team [phone] or [phone]    Chemical Dependency Detox:    - [facility]: [phone] - can ask for other recommendations    - [facility]([facility]): [phone]    - [place] [employer]: [phone] [facility]    - [facility] ([facility]): [phone]    - [place] [facility] : [phone] [facility]    - [facility]): [phone]    - [place] ([facility]): [phone]>      Chemical Dependency Assessment:    - [facility]: [phone]    - Behavioral Health Providers, can help find a provider near you: [phone]    - No insurance- call county of residence and ask about applying for a Rule 25

## 2021-02-12 LAB
BACTERIA SPEC CULT: ABNORMAL
Lab: ABNORMAL
SPECIMEN SOURCE: ABNORMAL

## 2021-02-13 ENCOUNTER — TELEPHONE (OUTPATIENT)
Dept: EMERGENCY MEDICINE | Facility: CLINIC | Age: 31
End: 2021-02-13

## 2021-02-13 NOTE — TELEPHONE ENCOUNTER
[facility] Emergency Department/Urgent Care Lab result notification:    [place] ED lab result protocol used  Wound Culture    Reason for call  Notify of lab results, assess symptoms,  review ED providers recommendations/discharge instructions (if necessary) and advise per ED lab result f/u protocol    Lab Result   Final Wound culture (Right forearm) report on 2/12/21  Emergency Dept discharge antibiotic prescribed: Sulfamethoxazole-Trimethoprim (Bactrim DS, Septra DS) 800-160 mg PO tablet,  1 tablet by mouth 2 times daily for 10 days.  #1. Bacteria, Heavy growth Staphylococcus aureus , which is [SUSCEPTIBLE] to antibiotic   Incision and Drainage performed in [place] ED [Yes / No]: Yes  Patient to be notified of result, symptoms assessed and advised per [place] ED lab result protocol.  Information table from ED Provider visit on 2/10/21  Symptoms reported at ED visit (Chief complaint, HPI) Cyst        Right lower forarme, pt reports she is an IV drug user, pt reports last time she used was 3 days ago. Pt reports she noticed some redness to area about 1 week ago, pt reports developed a hard lump with pain, pt denies fevers at this time.       HPI  Shruthi Vega is a 31 year old female who presents with complaints of abscess to right forearm.  Patient has history of methamphetamine abuse and IV drug use.  She states after injecting meth into this portion of her forearm, she subsequently developed a hard lump to the area that has become progressively more red, swollen, and painful.  Patient denies history of similar symptoms in the past.  She denies any systemic symptoms.  Denies fevers, chills, nausea, or vomiting.        ED providers Impression and Plan (applicable information) Pt is a 31 year old female who presents with complaints of abscess to right forearm.  Patient has history of methamphetamine abuse and IV drug use.  She states after injecting meth into this portion of her forearm, she  subsequently developed a hard lump to the area that has become progressively more red, swollen, and painful.  Patient denies any systemic symptoms.     Pt is afebrile on arrival.  Exam as above.  Abscess with I&D (see above procedure note).  Will place on antibiotics for treatment of associated cellulitis.  Wound culture is pending.  Will start patient on antibiotics with MRSA coverage.  Encouraged continued warm compresses to the area.  Discussed patient's history of drug abuse and patient expresses interest in quitting and would like information for possible outpatient CD treatment options.  This was therefore provided for patient and I also placed a referral for this as well to help facilitate follow-up.  Return precautions were reviewed.  Hand-outs were provided.     Patient was sent with Bactrim and was instructed to follow-up with PCP in 3-5 days for continued care and management or sooner if new or worsening symptoms.  She is to return to the ED for persistent and/or worsening symptoms.  Patient expressed understanding of the diagnosis and plan and was discharged home in good condition.   Miscellaneous information      RN Assessment (Patient s current Symptoms), include time called.  [Insert Left message here if message left]  12:30 Unable to leave message.    [RN Name]  Swathi Valle  [place] [facility]  Emergency Dept Lab Result RN  Ph# [phone]      Copy of Lab result   Contains abnormal data Wound Culture Aerobic Bacterial  Order: [id number]  Status:  Final result   Visible to patient:  Yes (MyChart) Dx:  Abscess of right forearm  Specimen Information: Forearm; Right Arm        Component 3d ago   Specimen Description Right Arm    Special Requests Specimen collected in eSwab transport (white cap)    Culture Micro Abnormal   Heavy growth   Staphylococcus aureus     [facility]   Susceptibility    Staphylococcus aureus (1)    Antibiotic Interpretation  Sensitivity Method Status   CLINDAMYCIN Sensitive <=0.25 ug/mL JIMMY Final    This isolate DOES NOT demonstrate inducible clindamycin resistance in vitro.   Clindamycin is susceptible and could be used when indicated, however,   erythromycin is resistant and should not be used.   ERYTHROMYCIN Resistant >=8 ug/mL JIMMY Final   GENTAMICIN Sensitive <=0.5 ug/mL JIMMY Final   TETRACYCLINE Sensitive <=1 ug/mL JIMMY Final   Trimethoprim/Sulfa Sensitive <=0.5/9.5 ug/mL JIMMY Final   VANCOMYCIN Sensitive 1 ug/mL JIMMY Final   OXACILLIN Sensitive   JIMMY Final         Specimen Collected: 02/10/21 11:15 AM Last Resulted: 02/12/21 11:20 AM

## 2021-11-29 ENCOUNTER — TELEPHONE (OUTPATIENT)
Dept: EMERGENCY MEDICINE | Facility: CLINIC | Age: 31
End: 2021-11-29

## 2021-11-29 ENCOUNTER — HOSPITAL ENCOUNTER (EMERGENCY)
Facility: CLINIC | Age: 31
Discharge: HOME | End: 2021-11-29
Attending: PHYSICIAN ASSISTANT | Admitting: PHYSICIAN ASSISTANT
Payer: COMMERCIAL

## 2021-11-29 VITALS
BODY MASS INDEX: 21.97 KG/M2 | HEIGHT: 67 IN | SYSTOLIC BLOOD PRESSURE: 110 MMHG | RESPIRATION RATE: 12 BRPM | WEIGHT: 140 LBS | HEART RATE: 101 BPM | TEMPERATURE: 98.7 F | OXYGEN SATURATION: 97 % | DIASTOLIC BLOOD PRESSURE: 75 MMHG

## 2021-11-29 DIAGNOSIS — Z20.822 SUSPECTED 2019 NOVEL CORONAVIRUS INFECTION: ICD-10-CM

## 2021-11-29 LAB
DEPRECATED S PYO AG THROAT QL EIA: NEGATIVE
FLUAV RNA SPEC QL NAA+PROBE: NEGATIVE
FLUBV RNA RESP QL NAA+PROBE: NEGATIVE
GROUP A STREP BY PCR: NOT DETECTED
SARS-COV-2 RNA RESP QL NAA+PROBE: POSITIVE

## 2021-11-29 PROCEDURE — 99282 EMERGENCY DEPT VISIT SF MDM: CPT | Performed by: PHYSICIAN ASSISTANT

## 2021-11-29 PROCEDURE — C9803 HOPD COVID-19 SPEC COLLECT: HCPCS

## 2021-11-29 PROCEDURE — 87636 SARSCOV2 & INF A&B AMP PRB: CPT | Performed by: PHYSICIAN ASSISTANT

## 2021-11-29 PROCEDURE — 87651 STREP A DNA AMP PROBE: CPT | Performed by: PHYSICIAN ASSISTANT

## 2021-11-29 PROCEDURE — 99283 EMERGENCY DEPT VISIT LOW MDM: CPT

## 2021-11-29 ASSESSMENT — MIFFLIN-ST. JEOR: SCORE: 1372.67

## 2021-11-29 NOTE — ED PROVIDER NOTES
History     Chief Complaint   Patient presents with     Covid 19 Testing     exposed to someone on 11/23 who was + - patient is here for testing r/t cough and feeling awful     Cough     HPI  Shruthi Vega is a 31 year old female with past medical history significant depression, anxiety, ETOH use who presents to the ER with concern over possible COVID-19 infection.  Patient reports that she did have exposure to somebody who tested positive last week.  Beginning on 11/25/2021 she developed subjective fever, chills, myalgias, sore throat, nasal congestion, cough.  She denies any significant shortness of breath, wheezing, nausea, vomiting, diarrhea or abdominal complaints.  She has attempted multiple OTC treatments without complete relief.  She denies any history of asthma, COPD or other breathing related disorders however she is a daily smoker.  She  has not been vaccinated for Covid-19    Allergies:  Allergies   Allergen Reactions     Doxycycline Other (See Comments)     Flu-like symptoms       Problem List:    Patient Active Problem List    Diagnosis Date Noted     Anxiety 09/23/2016     Priority: Medium     Alcohol abuse 09/23/2016     Priority: Medium     Herpes simplex type 1 infection 03/16/2016     Priority: Medium     Major depressive disorder, single episode, mild (H) 02/29/2016     Priority: Medium     Female infertility 03/02/2015     Priority: Medium     Infertility tests:  Baseline Labs-normal, including normal antimullerian hormone  Tubal Factors-HSG-normal  Male Factor-  Ovulation- luteal prog=7.6    Plan- offered Clomid ovulation enhancement       History of PID 03/02/2015     Priority: Medium     Recurrent vaginitis 04/04/2014     Priority: Medium     Alternates between yeast and BV  trimosan         Mittelschmerz 04/04/2014     Priority: Medium     CARDIOVASCULAR SCREENING; LDL GOAL LESS THAN 160 03/06/2013     Priority: Medium        Past Medical History:    Past Medical History:   Diagnosis  "Date     GERD (gastroesophageal reflux disease)        Past Surgical History:    Past Surgical History:   Procedure Laterality Date     MOUTH SURGERY      wisdom teeth       Family History:    Family History   Problem Relation Age of Onset     Hypertension Father        Social History:  Marital Status:  Single [1]  Social History     Tobacco Use     Smoking status: Current Every Day Smoker     Packs/day: 0.25     Years: 8.00     Pack years: 2.00     Types: Cigarettes     Last attempt to quit: 3/19/2014     Years since quittin.7     Smokeless tobacco: Never Used   Substance Use Topics     Alcohol use: No     Alcohol/week: 0.0 standard drinks     Drug use: Yes     Types: Methamphetamines        Medications:    Aspirin-Acetaminophen-Caffeine (EXCEDRIN PO)  escitalopram (LEXAPRO) 10 MG tablet  hydrOXYzine (ATARAX) 25 MG tablet  IBUPROFEN PO  valACYclovir (VALTREX) 1000 mg tablet      Review of Systems  CONSTITUTIONAL:POSITIVE  for subjective fever, chills, myalgias  INTEGUMENTARY/SKIN: NEGATIVE for worrisome rashes, moles or lesions  EYES: NEGATIVE for vision changes or irritation  ENT/MOUTH: POSITIVE for sore throat, nasal congestion and NEGATIVE for ear pain   RESP:POSITIVE for cough and NEGATIVE for SOB/dyspnea and wheezing  GI: NEGATIVE for abdominal pain, diarrhea, nausea and vomiting  Physical Exam   BP: 110/75  Pulse: 101  Temp: 98.7  F (37.1  C)  Resp: 12  Height: 170.2 cm (5' 7\")  Weight: 63.5 kg (140 lb)  SpO2: 97 %  Physical Exam  GENERAL APPEARANCE: .  alert, cooperative, no acute distress  EYES: EOMI,  PERRL, conjunctiva clear  HENT: ear canals and TM's normal.  Nose and mouth without ulcers, erythema or lesions  NECK: supple, nontender, no lymphadenopathy  RESP: lungs clear to auscultation - no rales, rhonchi or wheezes  CV: regular rates and rhythm, normal S1 S2, no murmur noted  ABDOMEN:  soft, nontender, no HSM or masses and bowel sounds normal  SKIN: no suspicious lesions or rashes  ED Course      "   Procedures       Critical Care time:  none          Results for orders placed or performed during the hospital encounter of 11/29/21 (from the past 24 hour(s))   Symptomatic Influenza A/B & SARS-CoV2 (COVID-19) Virus PCR Multiplex Nasopharyngeal    Specimen: Nasopharyngeal; Swab   Result Value Ref Range    Influenza A PCR Negative Negative    Influenza B PCR Negative Negative    SARS CoV2 PCR Positive (A) Negative    Narrative    Testing was performed using the [facility]ena SARS-CoV-2 & Influenza A/B Assay on the [facility] [employer] System. This test should be ordered for the detection of SARS-CoV-2 and influenza viruses in individuals who meet clinical and/or epidemiological criteria. Test performance is unknown in asymptomatic patients. This test is for in vitro diagnostic use under the FDA EUA for laboratories certified under CLIA to perform moderate and/or high complexity testing. This test has not been FDA cleared or approved. A negative result does not rule out the presence of PCR inhibitors in the specimen or target RNA in concentration below the limit of detection for the assay. If only one viral target is positive but coinfection with multiple targets is suspected, the sample should be re-tested with another FDA cleared, approved or authorized test, if coinfection would change clinical management. [facility] are certified under the Clinical Laboratory Improvement Amendments of 1988 (CLIA-88) as  qualified to perform moderate and/or high complexity laboratory testing.   Streptococcus A Rapid Scr w Reflx to PCR    Specimen: Throat; Swab   Result Value Ref Range    Group A Strep antigen Negative Negative     Medications - No data to display    Assessments & Plan (with Medical Decision Making)     I have reviewed the nursing notes.  I have reviewed the findings, diagnosis, plan and need for follow up with the patient.       Discharge Medication List as of 11/29/2021  2:17 PM        Final diagnoses:    Suspected 2019 novel coronavirus infection     31-year-old female presents to the emergency department with concern of a possible COVID-19 infection after she had exposure to somebody who tested positive last week.  She complains of subjective fever, chills, allergies, sore throat, nasal congestion and cough.  She was minimally tachycardic upon arrival however remainder of vital signs were stable.  Physical exam findings included lungs which were clear to auscultation without wheezing rales or rhonchi.  She did have negative rapid strep test with PCR testing at time of discharge.  Testing for COVID-19 was obtained and was pending at time of discharge which was ultimately positive.  I did attempt to contact patient by phone however her voice messaging system was full and I was unable to give her results.      Disclaimer: This note consists of symbols derived from keyboarding, dictation, and/or voice recognition software. As a result, there may be errors in the script that have gone undetected.  Please consider this when interpreting information found in the chart.    11/29/2021   [facility] EMERGENCY DEPT     Sophia Duval PA-C  11/29/21 0578

## 2021-11-30 NOTE — TELEPHONE ENCOUNTER
Coronavirus (COVID-19) Notification    Reason for call  Notify of POSITIVE  COVID-19 lab result, assess symptoms,  review [facility] recommendations    Lab Result   Lab test for 2019-nCoV rRt-PCR or SARS-COV-2 PCR  Oropharyngeal AND/OR nasopharyngeal swabs were POSITIVE for 2019-nCoV RNA [OR] SARS-COV-2 RNA (COVID-19) RNA     We have been unable to reach Patient by phone at this time to notify of their Positive COVID-19 result.  Left voicemail message requesting a call back to [phone] [facility] for results.        POSITIVE COVID-19 Letter sent.    Shiloh Srivastava LPN

## 2022-03-02 ENCOUNTER — HOSPITAL ENCOUNTER (EMERGENCY)
Facility: CLINIC | Age: 32
Discharge: HOME | End: 2022-03-02
Attending: PHYSICIAN ASSISTANT | Admitting: PHYSICIAN ASSISTANT
Payer: COMMERCIAL

## 2022-03-02 VITALS
WEIGHT: 130 LBS | SYSTOLIC BLOOD PRESSURE: 139 MMHG | BODY MASS INDEX: 20.4 KG/M2 | OXYGEN SATURATION: 100 % | RESPIRATION RATE: 18 BRPM | HEIGHT: 67 IN | TEMPERATURE: 97.9 F | DIASTOLIC BLOOD PRESSURE: 92 MMHG | HEART RATE: 91 BPM

## 2022-03-02 DIAGNOSIS — B00.9 HERPES SIMPLEX VIRUS INFECTION: ICD-10-CM

## 2022-03-02 PROCEDURE — G0463 HOSPITAL OUTPT CLINIC VISIT: HCPCS | Performed by: PHYSICIAN ASSISTANT

## 2022-03-02 PROCEDURE — 99214 OFFICE O/P EST MOD 30 MIN: CPT | Performed by: PHYSICIAN ASSISTANT

## 2022-03-02 RX ORDER — VALACYCLOVIR HYDROCHLORIDE 1 G/1
1000 TABLET, FILM COATED ORAL 2 TIMES DAILY
Qty: 20 TABLET | Refills: 0 | Status: SHIPPED | OUTPATIENT
Start: 2022-03-02 | End: 2023-02-21

## 2022-03-02 NOTE — ED PROVIDER NOTES
"  History     Chief Complaint   Patient presents with     Medication Refill     pt requesting valtrex refill.      HPI  Shruthi Vega is a 32 year old female who presents to the urgent care requesting a refill of Valtrex.  Patient reports a history of herpes infections.  Began developing a burning sensation \"like a little cuts\" over the last several days.  She is unaware of any vesicles, ulcerations or rashes at this time.  She also complains of urinary fequency, sore throat/tightness and ongoing pain in the posterior aspect of her neck. She denies any fever, chills, allergies, cough, chest pain, wheezing, nausea, vomiting, abdominal or flank pain. No dysuria, hematuria, vaginal itching or discharge.  She states her genital pain feels identical to prior outbreaks.      Allergies:  Allergies   Allergen Reactions     Doxycycline Other (See Comments)     Flu-like symptoms     Problem List:    Patient Active Problem List    Diagnosis Date Noted     Anxiety 09/23/2016     Priority: Medium     Alcohol abuse 09/23/2016     Priority: Medium     Herpes simplex type 1 infection 03/16/2016     Priority: Medium     Major depressive disorder, single episode, mild (H) 02/29/2016     Priority: Medium     Female infertility 03/02/2015     Priority: Medium     Infertility tests:  Baseline Labs-normal, including normal antimullerian hormone  Tubal Factors-HSG-normal  Male Factor-  Ovulation- luteal prog=7.6    Plan- offered Clomid ovulation enhancement       History of PID 03/02/2015     Priority: Medium     Recurrent vaginitis 04/04/2014     Priority: Medium     Alternates between yeast and BV  trimosan         Mittelschmerz 04/04/2014     Priority: Medium     CARDIOVASCULAR SCREENING; LDL GOAL LESS THAN 160 03/06/2013     Priority: Medium      Past Medical History:    Past Medical History:   Diagnosis Date     GERD (gastroesophageal reflux disease)      Past Surgical History:    Past Surgical History:   Procedure Laterality Date " "    MOUTH SURGERY      wisdom teeth     Family History:    Family History   Problem Relation Age of Onset     Hypertension Father      Social History:  Marital Status:  Single [1]  Social History     Tobacco Use     Smoking status: Current Every Day Smoker     Packs/day: 0.25     Years: 8.00     Pack years: 2.00     Types: Cigarettes     Last attempt to quit: 3/19/2014     Years since quittin.9     Smokeless tobacco: Never Used   Substance Use Topics     Alcohol use: No     Alcohol/week: 0.0 standard drinks     Drug use: Yes     Types: Methamphetamines      Medications:    valACYclovir (VALTREX) 1000 mg tablet  Aspirin-Acetaminophen-Caffeine (EXCEDRIN PO)  escitalopram (LEXAPRO) 10 MG tablet  hydrOXYzine (ATARAX) 25 MG tablet  IBUPROFEN PO  valACYclovir (VALTREX) 1000 mg tablet      Review of Systems  CONSTITUTIONAL:NEGATIVE for fever, chills, change in weight  INTEGUMENTARY/SKIN: NEGATIVE for worrisome rashes, moles or lesions  EYES: NEGATIVE for vision changes or irritation  ENT/MOUTH: POSITIVE for sore throat/tightness NEGATIVE for nasal congestion, ear pain, oral lesions   RESP:NEGATIVE for significant cough or SOB  GI:NEGATIVE for vomiting, diarrhea, abdominal pain   : POSITIVE for vaginal pain, urinary frequency negative for dysuria, increased urgency, hematuria or vaginal discharge  MS:POSITIVE for chronic neck pain NEGATIVE for new or worsening arthralgias or myalgias   Physical Exam   BP: (!) 139/92  Pulse: 91  Temp: 97.9  F (36.6  C)  Resp: 18  Height: 170.2 cm (5' 7\")  Weight: 59 kg (130 lb)  SpO2: 100 %  Physical Exam  GENERAL APPEARANCE: healthy, alert and no distress  EYES: EOMI,  PERRL, conjunctiva clear  HENT: ear canals and TM's normal.  Oral mucosa moist.  Posterior pharynx is nonerythematous without exudate.  No trismus or stridor  NECK: supple, nontender, no lymphadenopathy,  RESP: lungs clear to auscultation - no rales, rhonchi or wheezes  CV: regular rates and rhythm, normal S1 S2, no " "murmur noted  :  Exam deferred by patient  SKIN: no suspicious lesions or rashes  ED Course           Procedures       Critical Care time:  none        No results found for this or any previous visit (from the past 24 hour(s)).  Medications - No data to display    Assessments & Plan (with Medical Decision Making)     I have reviewed the nursing notes.  I have reviewed the findings, diagnosis, plan and need for follow up with the patient.       New Prescriptions    VALACYCLOVIR (VALTREX) 1000 MG TABLET    Take 1 tablet (1,000 mg) by mouth 2 times daily for 10 days     Final diagnoses:   Herpes simplex virus infection     32-year-old female known history of genital herpes presents to urgent care requesting refill of Valtrex given burning sensation \"like little cuts\" that developed over the last several days.  She had elevated blood pressure upon arrival, remainder vital signs stable.  Physical exam findings were benign however  exam was deferred by patient.  Given her clinical history I did agree to provide prescription for Valtrex at this time.  I have low suspicion for acute cystitis vulvovaginal candidiasis, trichomonas, bacterial vaginosis however did discuss her/benefits of further evaluation patient elected to defer.  Follow-up with primary care provider if no improvement within the next several days.  Worrisome reasons to return to ER/UC sooner discussed.     Disclaimer: This note consists of symbols derived from keyboarding, dictation, and/or voice recognition software. As a result, there may be errors in the script that have gone undetected.  Please consider this when interpreting information found in the chart.    3/2/2022   [facility] EMERGENCY DEPT     Sophia Duval PA-C  03/03/22 1227    "

## 2022-10-22 ENCOUNTER — HOSPITAL ENCOUNTER (EMERGENCY)
Facility: CLINIC | Age: 32
Discharge: HOME | End: 2022-10-22
Attending: STUDENT IN AN ORGANIZED HEALTH CARE EDUCATION/TRAINING PROGRAM | Admitting: STUDENT IN AN ORGANIZED HEALTH CARE EDUCATION/TRAINING PROGRAM
Payer: COMMERCIAL

## 2022-10-22 VITALS
DIASTOLIC BLOOD PRESSURE: 92 MMHG | BODY MASS INDEX: 20.4 KG/M2 | RESPIRATION RATE: 18 BRPM | OXYGEN SATURATION: 97 % | HEIGHT: 67 IN | WEIGHT: 130 LBS | HEART RATE: 98 BPM | TEMPERATURE: 99 F | SYSTOLIC BLOOD PRESSURE: 130 MMHG

## 2022-10-22 DIAGNOSIS — U07.1 INFECTION DUE TO 2019 NOVEL CORONAVIRUS: ICD-10-CM

## 2022-10-22 LAB
FLUAV RNA SPEC QL NAA+PROBE: NEGATIVE
FLUBV RNA RESP QL NAA+PROBE: NEGATIVE
SARS-COV-2 RNA RESP QL NAA+PROBE: POSITIVE

## 2022-10-22 PROCEDURE — 87636 SARSCOV2 & INF A&B AMP PRB: CPT | Performed by: STUDENT IN AN ORGANIZED HEALTH CARE EDUCATION/TRAINING PROGRAM

## 2022-10-22 PROCEDURE — 99284 EMERGENCY DEPT VISIT MOD MDM: CPT | Mod: CS | Performed by: STUDENT IN AN ORGANIZED HEALTH CARE EDUCATION/TRAINING PROGRAM

## 2022-10-22 PROCEDURE — 99283 EMERGENCY DEPT VISIT LOW MDM: CPT | Mod: CS | Performed by: STUDENT IN AN ORGANIZED HEALTH CARE EDUCATION/TRAINING PROGRAM

## 2022-10-22 PROCEDURE — C9803 HOPD COVID-19 SPEC COLLECT: HCPCS | Performed by: STUDENT IN AN ORGANIZED HEALTH CARE EDUCATION/TRAINING PROGRAM

## 2022-10-22 PROCEDURE — 250N000013 HC RX MED GY IP 250 OP 250 PS 637: Performed by: STUDENT IN AN ORGANIZED HEALTH CARE EDUCATION/TRAINING PROGRAM

## 2022-10-22 RX ORDER — ACETAMINOPHEN 325 MG/1
650 TABLET ORAL ONCE
Status: COMPLETED | OUTPATIENT
Start: 2022-10-22 | End: 2022-10-22

## 2022-10-22 RX ADMIN — ACETAMINOPHEN 650 MG: 325 TABLET, FILM COATED ORAL at 17:56

## 2022-10-22 RX ADMIN — IBUPROFEN 600 MG: 400 TABLET ORAL at 17:56

## 2022-10-22 NOTE — ED PROVIDER NOTES
History     Chief Complaint   Patient presents with     URI     HPI  Shruthi Vega is a 32 year old female who presents to the department for evaluation of symptoms including sore throat, congestion, dry cough, and generalized body aches.  Patient says that symptoms began 2 days ago after exposure to someone with similar symptoms, has not taken her temperature at home but describes tactile fevers.  She has been taking Sudafed without acetaminophen or ibuprofen.  She specifically denies headache, neck stiffness, difficulty swallowing, chest pain, shortness of breath, abdominal pain, vomiting or diarrhea.  No other complaints.      Allergies:  Allergies   Allergen Reactions     Doxycycline Other (See Comments)     Flu-like symptoms       Problem List:    Patient Active Problem List    Diagnosis Date Noted     Anxiety 09/23/2016     Priority: Medium     Alcohol abuse 09/23/2016     Priority: Medium     Herpes simplex type 1 infection 03/16/2016     Priority: Medium     Major depressive disorder, single episode, mild (H) 02/29/2016     Priority: Medium     Female infertility 03/02/2015     Priority: Medium     Infertility tests:  Baseline Labs-normal, including normal antimullerian hormone  Tubal Factors-HSG-normal  Male Factor-  Ovulation- luteal prog=7.6    Plan- offered Clomid ovulation enhancement       History of PID 03/02/2015     Priority: Medium     Recurrent vaginitis 04/04/2014     Priority: Medium     Alternates between yeast and BV  trimosan         Mittelschmerz 04/04/2014     Priority: Medium     CARDIOVASCULAR SCREENING; LDL GOAL LESS THAN 160 03/06/2013     Priority: Medium        Past Medical History:    Past Medical History:   Diagnosis Date     GERD (gastroesophageal reflux disease)        Past Surgical History:    Past Surgical History:   Procedure Laterality Date     MOUTH SURGERY      wisdom teeth       Family History:    Family History   Problem Relation Age of Onset     Hypertension Father   "      Social History:  Marital Status:  Single [1]  Social History     Tobacco Use     Smoking status: Every Day     Packs/day: 0.25     Years: 8.00     Pack years: 2.00     Types: Cigarettes     Last attempt to quit: 3/19/2014     Years since quittin.6     Smokeless tobacco: Never   Substance Use Topics     Alcohol use: No     Alcohol/week: 0.0 standard drinks     Drug use: Yes     Types: Methamphetamines        Medications:    Aspirin-Acetaminophen-Caffeine (EXCEDRIN PO)  escitalopram (LEXAPRO) 10 MG tablet  hydrOXYzine (ATARAX) 25 MG tablet  IBUPROFEN PO  valACYclovir (VALTREX) 1000 mg tablet  valACYclovir (VALTREX) 1000 mg tablet          Review of Systems  Constitutional: Positive for tactile fevers.  HENT: Positive for sore throat with nasal congestion  Cardiovascular:  Negative for chest discomfort.  Respiratory: Positive for dry cough.  Negative for shortness of breath.   Gastrointestinal:  Negative for abdominal pain, diarrhea, nausea or vomiting.   Genitourinary:  Negative for dysuria.  Musculoskeletal: Positive for generalized body aches.  Negative for neck pain or stiffness.  Denies joint swelling.  Neurological:  Negative for headache or dizziness.    All others reviewed and are negative.      Physical Exam   BP: (!) 130/92  Pulse: 98  Temp: 99  F (37.2  C)  Resp: 18  Height: 170.2 cm (5' 7\")  Weight: 59 kg (130 lb)  SpO2: 97 %      Physical Exam  Constitutional:  Well developed, well nourished.  Appears nontoxic and in no acute distress.   HENT:  Normocephalic and atraumatic.  Symmetric in appearance.  Oral:  Patient is without trismus.  Moist oral mucosa.  Dentition is without significant decay.  Negative pharyngeal erythema or exudate.  No uvular asymmetry.  Without sublingual or submental swelling.  Voice is not muffled.  Tolerates secretions.  Neck:  Neck supple without nuchal rigidity  Cardiovascular:  No cyanosis.  RRR.  No audible murmurs noted.   Respiratory:  Effort normal without sign of " respiratory distress.  No audible wheezing or stridor.  CTAB.   Gastrointestinal:  Soft nondistended abdomen.  Nontender and without guarding.  No rigidity or rebound tenderness.    Musculoskeletal:  Moves extremities spontaneously.  Neurological:  Patient is alert.  Skin:  Skin is warm and dry.  Psychiatric:  Normal mood and affect.      ED Course                 Procedures             Critical Care time:  none               Results for orders placed or performed during the hospital encounter of 10/22/22 (from the past 24 hour(s))   Symptomatic; Yes; 10/19/2022 Influenza A/B & SARS-CoV2 (COVID-19) Virus PCR Multiplex Nose    Specimen: Nose; Swab   Result Value Ref Range    Influenza A PCR Negative Negative    Influenza B PCR Negative Negative    SARS CoV2 PCR Positive (A) Negative    Narrative    Testing was performed using the [facility]ena SARS-CoV-2 & Influenza A/B Assay on the [facility]ena [employer] System. This test should be ordered for the detection of SARS-CoV-2 and influenza viruses in individuals who meet clinical and/or epidemiological criteria. Test performance is unknown in asymptomatic patients. This test is for in vitro diagnostic use under the FDA EUA for laboratories certified under CLIA to perform moderate and/or high complexity testing. This test has not been FDA cleared or approved. A negative result does not rule out the presence of PCR inhibitors in the specimen or target RNA in concentration below the limit of detection for the assay. If only one viral target is positive but coinfection with multiple targets is suspected, the sample should be re-tested with another FDA cleared, approved or authorized test, if coinfection would change clinical management. [facility] are certified under the Clinical Laboratory Improvement Amendments of 1988 (CLIA-88) as qualified to perform moderate and/or high complexity laboratory testing.       Medications   ibuprofen (ADVIL/MOTRIN) tablet 600 mg (600 mg  Oral Given 10/22/22 1756)   acetaminophen (TYLENOL) tablet 650 mg (650 mg Oral Given 10/22/22 1756)       Assessments & Plan (with Medical Decision Making)   Shruthi Vega is a 32 year old female who presents to the department for evaluation of symptoms including dry cough, sore throat, and generalized body aches.  She is without recent antipyretic or symptomatic medications, and arrived to the department afebrile.  Lung sounds clear to auscultation, benign abdominal examination, no sign of pharyngitis.  Rapid influenza testing negative, however COVID test positive and likely the cause for patient's symptoms.  Patient given instructions for symptomatic management and close monitoring with return instructions.          Disclaimer:  This note consists of symbols derived from keyboarding, dictation, and/or voice recognition software.  As a result, there may be errors in the script that have gone undetected.  Please consider this when interpreting information found in the chart.        I have reviewed the nursing notes.    I have reviewed the findings, diagnosis, plan and need for follow up with the patient.       Discharge Medication List as of 10/22/2022  6:28 PM          Final diagnoses:   Infection due to 2019 novel coronavirus       10/22/2022   [facility] EMERGENCY DEPT     Seb Ivey DO  10/22/22 4740

## 2022-10-22 NOTE — ED TRIAGE NOTES
Pt presents to ER with 3 days of cough, fever and body aches.      Triage Assessment     Row Name 10/22/22 8729       Triage Assessment (Adult)    Airway WDL WDL       Respiratory WDL    Respiratory WDL WDL       Cognitive/Neuro/Behavioral WDL    Cognitive/Neuro/Behavioral WDL WDL

## 2022-10-24 ENCOUNTER — PATIENT OUTREACH (OUTPATIENT)
Dept: FAMILY MEDICINE | Facility: CLINIC | Age: 32
End: 2022-10-24

## 2022-10-24 NOTE — TELEPHONE ENCOUNTER
ED / Discharge Outreach Protocol    Patient Contact    Attempt # 1    Was call answered?  No.  Unable to leave message. Mailbox full. Arianne ALONSO RN

## 2022-10-26 NOTE — TELEPHONE ENCOUNTER
ED / Discharge Outreach Protocol    Patient Contact    Attempt # 2    Was call answered?  No.  Unable to leave message. No mailbox. Arianne ALONSO RN

## 2023-02-05 ENCOUNTER — HOSPITAL ENCOUNTER (EMERGENCY)
Facility: CLINIC | Age: 33
Discharge: HOME | End: 2023-02-05
Attending: NURSE PRACTITIONER | Admitting: NURSE PRACTITIONER
Payer: COMMERCIAL

## 2023-02-05 VITALS
TEMPERATURE: 97.9 F | HEIGHT: 67 IN | BODY MASS INDEX: 21.66 KG/M2 | OXYGEN SATURATION: 100 % | WEIGHT: 138 LBS | HEART RATE: 90 BPM | DIASTOLIC BLOOD PRESSURE: 100 MMHG | RESPIRATION RATE: 16 BRPM | SYSTOLIC BLOOD PRESSURE: 149 MMHG

## 2023-02-05 DIAGNOSIS — S61.213A LACERATION OF LEFT MIDDLE FINGER WITHOUT FOREIGN BODY WITHOUT DAMAGE TO NAIL, INITIAL ENCOUNTER: ICD-10-CM

## 2023-02-05 PROCEDURE — 12002 RPR S/N/AX/GEN/TRNK2.6-7.5CM: CPT | Performed by: NURSE PRACTITIONER

## 2023-02-05 PROCEDURE — 90715 TDAP VACCINE 7 YRS/> IM: CPT | Performed by: NURSE PRACTITIONER

## 2023-02-05 PROCEDURE — 99283 EMERGENCY DEPT VISIT LOW MDM: CPT | Mod: 25 | Performed by: NURSE PRACTITIONER

## 2023-02-05 PROCEDURE — 90471 IMMUNIZATION ADMIN: CPT | Performed by: NURSE PRACTITIONER

## 2023-02-05 PROCEDURE — 250N000011 HC RX IP 250 OP 636: Performed by: NURSE PRACTITIONER

## 2023-02-05 RX ADMIN — CLOSTRIDIUM TETANI TOXOID ANTIGEN (FORMALDEHYDE INACTIVATED), CORYNEBACTERIUM DIPHTHERIAE TOXOID ANTIGEN (FORMALDEHYDE INACTIVATED), BORDETELLA PERTUSSIS TOXOID ANTIGEN (GLUTARALDEHYDE INACTIVATED), BORDETELLA PERTUSSIS FILAMENTOUS HEMAGGLUTININ ANTIGEN (FORMALDEHYDE INACTIVATED), BORDETELLA PERTUSSIS PERTACTIN ANTIGEN, AND BORDETELLA PERTUSSIS FIMBRIAE 2/3 ANTIGEN 0.5 ML: 5; 2; 2.5; 5; 3; 5 INJECTION, SUSPENSION INTRAMUSCULAR at 20:29

## 2023-02-05 ASSESSMENT — ACTIVITIES OF DAILY LIVING (ADL): ADLS_ACUITY_SCORE: 35

## 2023-02-05 NOTE — LETTER
February 5, 2023      To Whom It May Concern:      Shruthi Vega was seen in our Emergency Department today, 02/05/23.   No work 2/5/2023 - 2/8/2023.  May return to work on 2/9/2023 but will need to wear finger splint to protect sutures until the sutures are removed.    Sincerely,        ALBERT Watts CNP

## 2023-02-06 ENCOUNTER — TELEPHONE (OUTPATIENT)
Dept: FAMILY MEDICINE | Facility: CLINIC | Age: 33
End: 2023-02-06
Payer: COMMERCIAL

## 2023-02-06 NOTE — TELEPHONE ENCOUNTER
Patient calling with concerns about pain in her finger that stitches were placed last evening in the emergency department. She has not tried Tylenol or Ibuprofen. Questioning if more painful now due to lidocaine used with suturing has worn off. Patient denies increased redness, swelling or any drainage. She will take medication and see if is helpful. She will reach out for worsening symptoms or if pain does not improve.She will also try cold over dressing.    Note from Emergency Department visit on 2/5/23:  Assessments & Plan (with Medical Decision Making)   Left middle finger laceration exam and repaired as noted above.      Plan as follows:  Ok to shower, but otherwise keep the wound clean and dry.   Keep covered with bandage until sutures removed.  Finger splint to protect sutures.  Sutures out in 10 days.  Return for any signs of infection--increased redness, swelling, drainage, or pain.        Marta Schulz RN

## 2023-02-06 NOTE — ED PROVIDER NOTES
History     Chief Complaint   Patient presents with     Laceration     HPI  Shruthi Vega is a 33 year old female who presents for evaluation of laceration to her left middle finger.  Patient was using a switchblade knife to open a box.  She accidentally cut the volar aspect of her left middle finger.  Bleeding is controlled.  Tetanus is not up-to-date.    Allergies:  Allergies   Allergen Reactions     Doxycycline Other (See Comments)     Flu-like symptoms       Problem List:    Patient Active Problem List    Diagnosis Date Noted     Anxiety 2016     Priority: Medium     Alcohol abuse 2016     Priority: Medium     Herpes simplex type 1 infection 2016     Priority: Medium     Major depressive disorder, single episode, mild (H) 2016     Priority: Medium     Female infertility 2015     Priority: Medium     Infertility tests:  Baseline Labs-normal, including normal antimullerian hormone  Tubal Factors-HSG-normal  Male Factor-  Ovulation- luteal prog=7.6    Plan- offered Clomid ovulation enhancement       History of PID 2015     Priority: Medium     Recurrent vaginitis 2014     Priority: Medium     Alternates between yeast and BV  trimosan         Mittelschmerz 2014     Priority: Medium     CARDIOVASCULAR SCREENING; LDL GOAL LESS THAN 160 2013     Priority: Medium        Past Medical History:    Past Medical History:   Diagnosis Date     GERD (gastroesophageal reflux disease)        Past Surgical History:    Past Surgical History:   Procedure Laterality Date     MOUTH SURGERY      wisdom teeth       Family History:    Family History   Problem Relation Age of Onset     Hypertension Father        Social History:  Marital Status:  Single [1]  Social History     Tobacco Use     Smoking status: Every Day     Packs/day: 0.25     Years: 8.00     Pack years: 2.00     Types: Cigarettes     Last attempt to quit: 3/19/2014     Years since quittin.8     Smokeless  "tobacco: Never   Substance Use Topics     Alcohol use: No     Alcohol/week: 0.0 standard drinks     Drug use: Yes     Types: Methamphetamines        Medications:    Aspirin-Acetaminophen-Caffeine (EXCEDRIN PO)  escitalopram (LEXAPRO) 10 MG tablet  hydrOXYzine (ATARAX) 25 MG tablet  IBUPROFEN PO  valACYclovir (VALTREX) 1000 mg tablet  valACYclovir (VALTREX) 1000 mg tablet          Review of Systems  As mentioned above in the history present illness. All other systems were reviewed and are negative.    Physical Exam   BP: (!) 143/93  Pulse: 101  Temp: 97.9  F (36.6  C)  Resp: 18  Height: 170.2 cm (5' 7\")  Weight: 62.6 kg (138 lb)  SpO2: 100 %      Physical Exam  Appearance: Alert, oriented, no acute distress.  Left middle finger Laceration 3 cm noted to the volar aspect between the PIP and DIP joint.  Description: clean wound edges, no foreign bodies.   --FROM and strength in all planes. Flexion/extension/adduction/abduction.  --Neurovascular and tendon structures are intact. Left hand warm and pink.      ED [facility][note 3]    -Laceration Repair    Date/Time: 2/5/2023 8:00 PM  Performed by: Adela Tello APRN CNP  Authorized by: Adela Tello APRN CNP     Risks, benefits and alternatives discussed.      ANESTHESIA (see MAR for exact dosages):     Anesthesia method:  Local infiltration    Local anesthetic:  Bupivacaine 0.5% w/o epi  LACERATION DETAILS     Location:  Finger    Finger location:  L long finger    Length (cm):  3    REPAIR TYPE:     Repair type:  Simple      EXPLORATION:     Wound extent: no tendon damage      Contaminated: no      TREATMENT:     Area cleansed with:  Saline    SKIN REPAIR     Repair method:  Sutures    Suture size:  3-0    Suture material:  Nylon    Number of sutures:  5    APPROXIMATION     Approximation:  Close    POST-PROCEDURE DETAILS     Dressing:  Adhesive bandage and splint for protection        PROCEDURE    Patient " Tolerance:  Patient tolerated the procedure well with no immediate complications                No results found for this or any previous visit (from the past 24 hour(s)).    Medications   Tdap (tetanus-diphtheria-acell pertussis) (ADACEL) injection 0.5 mL (0.5 mLs Intramuscular Given 2/5/23 2029)       Assessments & Plan (with Medical Decision Making)   Left middle finger laceration exam and repaired as noted above.     Plan as follows:  Ok to shower, but otherwise keep the wound clean and dry.   Keep covered with bandage until sutures removed.  Finger splint to protect sutures.  Sutures out in 10 days.  Return for any signs of infection--increased redness, swelling, drainage, or pain.          Discharge Medication List as of 2/5/2023  8:55 PM          Final diagnoses:   Laceration of left middle finger without foreign body without damage to nail, initial encounter       2/5/2023   [facility] EMERGENCY DEPT     Adela Tello APRN CNP  02/05/23 [id number]

## 2023-02-06 NOTE — DISCHARGE INSTRUCTIONS
Ok to shower, but otherwise keep the wound clean and dry.   Keep covered with bandage until sutures removed.  Finger splint to protect sutures.  Sutures out in 10 days.  Return for any signs of infection--increased redness, swelling, drainage, or pain.

## 2023-02-06 NOTE — ED TRIAGE NOTES
Left middle finger laceration from knife     Triage Assessment     Row Name 02/05/23 1923       Skin Circulation/Temperature WDL    Skin Circulation/Temperature WDL X

## 2023-02-08 ENCOUNTER — E-VISIT (OUTPATIENT)
Dept: FAMILY MEDICINE | Facility: CLINIC | Age: 33
End: 2023-02-08
Payer: COMMERCIAL

## 2023-02-08 ENCOUNTER — OFFICE VISIT (OUTPATIENT)
Dept: URGENT CARE | Facility: URGENT CARE | Age: 33
End: 2023-02-08
Payer: COMMERCIAL

## 2023-02-08 VITALS
OXYGEN SATURATION: 99 % | TEMPERATURE: 97.8 F | DIASTOLIC BLOOD PRESSURE: 85 MMHG | BODY MASS INDEX: 21.61 KG/M2 | SYSTOLIC BLOOD PRESSURE: 128 MMHG | WEIGHT: 138 LBS | HEART RATE: 86 BPM

## 2023-02-08 DIAGNOSIS — S61.209A OPEN WOUND OF FINGER WITH TENDON INJURY, INITIAL ENCOUNTER: ICD-10-CM

## 2023-02-08 DIAGNOSIS — Z51.89 VISIT FOR WOUND CHECK: Primary | ICD-10-CM

## 2023-02-08 DIAGNOSIS — M79.645 PAIN OF FINGER OF LEFT HAND: Primary | ICD-10-CM

## 2023-02-08 PROCEDURE — 99203 OFFICE O/P NEW LOW 30 MIN: CPT | Performed by: FAMILY MEDICINE

## 2023-02-08 PROCEDURE — 99207 PR NON-BILLABLE SERV PER CHARTING: CPT | Performed by: NURSE PRACTITIONER

## 2023-02-08 RX ORDER — CEPHALEXIN 500 MG/1
500 CAPSULE ORAL 3 TIMES DAILY
Qty: 21 CAPSULE | Refills: 0 | Status: SHIPPED | OUTPATIENT
Start: 2023-02-08 | End: 2023-02-15

## 2023-02-08 NOTE — PATIENT INSTRUCTIONS
Dear Shruthi Vega,    We are sorry you are not feeling well. Based on the responses you provided, it is recommended that you be seen in-person in urgent care so we can better evaluate your symptoms. Please click here to find the nearest urgent care location to you.   You will not be charged for this Visit. Thank you for trusting us with your care.    Lilian Kelly, CNP

## 2023-02-08 NOTE — LETTER
February 8, 2023      To Whom It May Concern:      Shruthi Vega was seen in our urgent care. No work tomorrow.    Sincerely,        Jimmy Vick MD

## 2023-02-09 NOTE — PROGRESS NOTES
Assessment & Plan     Pain of finger of left hand  ?tendon injury from laceration seen in ER  - Orthopedic  Referral    Open wound of finger with tendon injury, initial encounter  Start anbx, ice and elevate and ibuprofen  - Orthopedic  Referral  - cephALEXin (KEFLEX) 500 MG capsule  Dispense: 21 capsule; Refill: 0             No follow-ups on file.    Jimmy Vick MD  [facility] [place]    Demi Hawkins is a 33 year old adult who presents to clinic today for the following health issues:  Chief Complaint   Patient presents with     Wound Check     Check stitches on left middle finger, very painful and swollen     HPI    Cut finger with pocket knife few days ago.  Finger is painful and swelling.  Not pale  Not bleeding.  Not able to bend    Review of Systems        Objective    /85   Pulse 86   Temp 97.8  F (36.6  C) (Tympanic)   Wt 62.6 kg (138 lb)   SpO2 99%   BMI 21.61 kg/m    Physical Exam  Vitals and nursing note reviewed.   Constitutional:       Appearance: Normal appearance.   Musculoskeletal:         General: Swelling, tenderness and signs of injury present. No deformity.      Comments: Not able to flex distally  Sensation intact   Neurological:      Mental Status: She is alert.

## 2023-02-09 NOTE — TELEPHONE ENCOUNTER
DIAGNOSIS: Open wound of finger with tendon injury   APPOINTMENT DATE: 02/13/2023   NOTES STATUS DETAILS   OFFICE NOTE from referring provider Internal 02/08/2023 Dr Vick [facility]    OFFICE NOTE from other specialist N/A    DISCHARGE SUMMARY from hospital N/A    DISCHARGE REPORT from the ER Internal 02/05/2023 [facility] ED    OPERATIVE REPORT N/A    MEDICATION LIST N/A    EMG (for Spine) N/A    IMPLANT RECORD/STICKER N/A    LABS     CBC/DIFF N/A    CULTURES N/A    INJECTIONS DONE IN RADIOLOGY N/A    MRI N/A    CT SCAN N/A    XRAYS (IMAGES & REPORTS) N/A    TUMOR     PATHOLOGY  Slides & report N/A

## 2023-02-13 ENCOUNTER — PRE VISIT (OUTPATIENT)
Dept: ORTHOPEDICS | Facility: CLINIC | Age: 33
End: 2023-02-13

## 2023-02-21 ENCOUNTER — OFFICE VISIT (OUTPATIENT)
Dept: FAMILY MEDICINE | Facility: CLINIC | Age: 33
End: 2023-02-21
Payer: COMMERCIAL

## 2023-02-21 ENCOUNTER — TELEPHONE (OUTPATIENT)
Dept: FAMILY MEDICINE | Facility: CLINIC | Age: 33
End: 2023-02-21

## 2023-02-21 VITALS
TEMPERATURE: 97.6 F | HEIGHT: 67 IN | DIASTOLIC BLOOD PRESSURE: 80 MMHG | HEART RATE: 83 BPM | RESPIRATION RATE: 18 BRPM | SYSTOLIC BLOOD PRESSURE: 132 MMHG | WEIGHT: 126 LBS | OXYGEN SATURATION: 100 % | BODY MASS INDEX: 19.78 KG/M2

## 2023-02-21 DIAGNOSIS — F15.21 METHAMPHETAMINE DEPENDENCE IN REMISSION (H): ICD-10-CM

## 2023-02-21 DIAGNOSIS — F32.0 MAJOR DEPRESSIVE DISORDER, SINGLE EPISODE, MILD (H): ICD-10-CM

## 2023-02-21 DIAGNOSIS — S61.209D OPEN WOUND OF FINGER WITH TENDON INJURY, SUBSEQUENT ENCOUNTER: ICD-10-CM

## 2023-02-21 DIAGNOSIS — Z72.0 TOBACCO ABUSE: ICD-10-CM

## 2023-02-21 DIAGNOSIS — Z01.818 PREOP GENERAL PHYSICAL EXAM: Primary | ICD-10-CM

## 2023-02-21 DIAGNOSIS — F41.9 ANXIETY: ICD-10-CM

## 2023-02-21 PROCEDURE — 99214 OFFICE O/P EST MOD 30 MIN: CPT | Performed by: FAMILY MEDICINE

## 2023-02-21 ASSESSMENT — PATIENT HEALTH QUESTIONNAIRE - PHQ9
10. IF YOU CHECKED OFF ANY PROBLEMS, HOW DIFFICULT HAVE THESE PROBLEMS MADE IT FOR YOU TO DO YOUR WORK, TAKE CARE OF THINGS AT HOME, OR GET ALONG WITH OTHER PEOPLE: SOMEWHAT DIFFICULT
SUM OF ALL RESPONSES TO PHQ QUESTIONS 1-9: 13
SUM OF ALL RESPONSES TO PHQ QUESTIONS 1-9: 13

## 2023-02-21 ASSESSMENT — PAIN SCALES - GENERAL: PAINLEVEL: NO PAIN (0)

## 2023-02-21 NOTE — PROGRESS NOTES
[facility]  [address] [place] [address]  Phone: [phone]  Fax: [phone]  Primary Provider: No Ref-Primary, Physician  Pre-op Performing Provider: LAINE DUNLAP      PREOPERATIVE EVALUATION:  Today's date: 2/21/2023    Shruthi Vega is a 33 year old female who presents for a preoperative evaluation.    Surgical Information:  Surgery/Procedure: Tendon repair in middle finger- left hand   Surgery Location: [facility]  Surgeon: ?  Surgery Date: 2/22/23  Time of Surgery: 10am   Where patient plans to recover: At home with family  Fax number for surgical facility:     Type of Anesthesia Anticipated: General    Assessment & Plan     The proposed surgical procedure is considered LOW risk.      ICD-10-CM    1. Preop general physical exam  Z01.818       2. Open wound of finger with tendon injury, subsequent encounter  S61.209D       3. Methamphetamine dependence in remission (H)  F15.21       4. Major depressive disorder, single episode, mild (H)  F32.0       5. Anxiety  F41.9       6. Tobacco abuse  Z72.0             Risks and Recommendations:  The patient has the following additional risks and recommendations for perioperative complications:   - No identified additional risk factors other than previously addressed    Medication Instructions:  Patient is on no chronic medications    RECOMMENDATION:  APPROVAL GIVEN to proceed with proposed procedure, without further diagnostic evaluation.        Subjective     HPI related to upcoming procedure:   33-year-old female presents for a preop physical exam.  Patient is scheduled to have left hand middle finger tendon repair tomorrow.  She requires evaluation and anesthesia risk assessment prior to undergoing surgery/procedure.  Patient denies any fever, chills, sore throat, cough, shortness of breath, chest pain, palpitation, diarrhea, constipation, abdominal pain, headache or other relevant systemic  symptoms.      Preop Questions 2/21/2023   1. Have you ever had a heart attack or stroke? No   2. Have you ever had surgery on your heart or blood vessels, such as a stent placement, a coronary artery bypass, or surgery on an artery in your head, neck, heart, or legs? No   3. Do you have chest pain with activity? No   4. Do you have a history of  heart failure? No   5. Do you currently have a cold, bronchitis or symptoms of other infection? No   6. Do you have a cough, shortness of breath, or wheezing? No   7. Do you or anyone in your family have previous history of blood clots? UNKNOWN -not personal    8. Do you or does anyone in your family have a serious bleeding problem such as prolonged bleeding following surgeries or cuts? UNKNOWN - not personal    9. Have you ever had problems with anemia or been told to take iron pills? No   10. Have you had any abnormal blood loss such as black, tarry or bloody stools, or abnormal vaginal bleeding? No   11. Have you ever had a blood transfusion? No   12. Are you willing to have a blood transfusion if it is medically needed before, during, or after your surgery? Yes   13. Have you or any of your relatives ever had problems with anesthesia? YES - nausea   14. Do you have sleep apnea, excessive snoring or daytime drowsiness? No    14a. Do you have a CPAP machine? No   15. Do you have any artifical heart valves or other implanted medical devices like a pacemaker, defibrillator, or continuous glucose monitor? No   16. Do you have artificial joints? No   17. Are you allergic to latex? No   18. Is there any chance that you may be pregnant? No       Preoperative Review of :   reviewed - no record of controlled substances prescribed.      Status of Chronic Conditions:  See problem list for active medical problems.  Problems all longstanding and stable, except as noted/documented.  See ROS for pertinent symptoms related to these conditions.      Review of  Systems  CONSTITUTIONAL: NEGATIVE for fever, chills, change in weight  INTEGUMENTARY/SKIN: NEGATIVE for worrisome rashes, moles or lesions  EYES: NEGATIVE for vision changes or irritation  ENT/MOUTH: NEGATIVE for ear, mouth and throat problems  RESP: NEGATIVE for significant cough or SOB  CV: NEGATIVE for chest pain, palpitations or peripheral edema  GI: NEGATIVE for nausea, abdominal pain, heartburn, or change in bowel habits  : NEGATIVE for frequency, dysuria, or hematuria  MUSCULOSKELETAL: NEGATIVE for significant arthralgias or myalgia  NEURO: NEGATIVE for weakness, dizziness or paresthesias  ENDOCRINE: NEGATIVE for temperature intolerance, skin/hair changes  HEME: NEGATIVE for bleeding problems  PSYCHIATRIC: NEGATIVE for changes in mood or affect    Patient Active Problem List    Diagnosis Date Noted     Anxiety 09/23/2016     Priority: Medium     Alcohol abuse 09/23/2016     Priority: Medium     Herpes simplex type 1 infection 03/16/2016     Priority: Medium     Major depressive disorder, single episode, mild (H) 02/29/2016     Priority: Medium     Female infertility 03/02/2015     Priority: Medium     Infertility tests:  Baseline Labs-normal, including normal antimullerian hormone  Tubal Factors-HSG-normal  Male Factor-  Ovulation- luteal prog=7.6    Plan- offered Clomid ovulation enhancement       History of PID 03/02/2015     Priority: Medium     Recurrent vaginitis 04/04/2014     Priority: Medium     Alternates between yeast and BV  trimosan         Mittelschmerz 04/04/2014     Priority: Medium     CARDIOVASCULAR SCREENING; LDL GOAL LESS THAN 160 03/06/2013     Priority: Medium      Past Medical History:   Diagnosis Date     Arthritis      Depressive disorder      GERD (gastroesophageal reflux disease)      Hypertension      Past Surgical History:   Procedure Laterality Date     COLONOSCOPY       MOUTH SURGERY      wisdom teeth     No current outpatient medications on file.       Allergies   Allergen  "Reactions     Doxycycline Other (See Comments)     Flu-like symptoms        Social History     Tobacco Use     Smoking status: Every Day     Packs/day: 4.00     Years: 10.00     Pack years: 40.00     Types: Cigarettes     Start date: 2010     Last attempt to quit: 2021     Years since quittin.3     Smokeless tobacco: Never   Substance Use Topics     Alcohol use: Not Currently     Family History   Problem Relation Age of Onset     Hypertension Father      Cerebrovascular Disease Maternal Grandmother      Asthma Maternal Half-Sister      Thyroid Disease Maternal Half-Sister      History   Drug Use Unknown         Objective     /80 (Cuff Size: Adult Regular)   Pulse 83   Temp 97.6  F (36.4  C) (Tympanic)   Resp 18   Ht 1.702 m (5' 7\")   Wt 57.2 kg (126 lb)   LMP 2023   SpO2 100%   BMI 19.73 kg/m      Physical Exam    GENERAL APPEARANCE: healthy, alert and no distress     EYES: EOMI, PERRL     HENT: ear canals and TM's normal and nose and mouth without ulcers or lesions     NECK: no adenopathy, no asymmetry, masses, or scars and thyroid normal to palpation     RESP: lungs clear to auscultation - no rales, rhonchi or wheezes     CV: regular rates and rhythm, normal S1 S2, no S3 or S4 and no murmur, click or rub     ABDOMEN:  soft, nontender, no HSM or masses and bowel sounds normal     MS: extremities normal- no gross deformities noted, no evidence of inflammation in joints, FROM in all extremities.     SKIN: no suspicious lesions or rashes     NEURO: Normal strength and tone, sensory exam grossly normal, mentation intact and speech normal     PSYCH: mentation appears normal. and affect normal/bright     LYMPHATICS: No cervical adenopathy    No results for input(s): HGB, PLT, INR, NA, POTASSIUM, CR, A1C in the last 41868 hours.     Diagnostics:  No labs were ordered during this visit.   No EKG required for low risk surgery (cataract, skin procedure, breast biopsy, etc).    Revised " Cardiac Risk Index (RCRI):  The patient has the following serious cardiovascular risks for perioperative complications:   - No serious cardiac risks = 0 points     RCRI Interpretation: 0 points: Class I (very low risk - 0.4% complication rate)           Signed Electronically by: Sal Babcock MD  Copy of this evaluation report is provided to requesting physician.

## 2023-02-21 NOTE — TELEPHONE ENCOUNTER
Patient called with Fax # for pre op (completed 2/21 w/ Dr. Babcock)     Fax: [phone].     Pre op faxed.

## 2023-02-21 NOTE — NURSING NOTE
"Chief Complaint   Patient presents with     Pre-Op Exam     /80 (Cuff Size: Adult Regular)   Pulse 83   Temp 97.6  F (36.4  C) (Tympanic)   Resp 18   Ht 1.702 m (5' 7\")   Wt 57.2 kg (126 lb)   LMP 02/07/2023   SpO2 100%   BMI 19.73 kg/m   Estimated body mass index is 19.73 kg/m  as calculated from the following:    Height as of this encounter: 1.702 m (5' 7\").    Weight as of this encounter: 57.2 kg (126 lb).  Patient presents to the clinic using No DME      Health Maintenance that is potentially due pending provider review:    Health Maintenance Due   Topic Date Due     ANNUAL REVIEW OF HM ORDERS  Never done     ADVANCE CARE PLANNING  Never done     COVID-19 Vaccine (1) Never done     Pneumococcal Vaccine: Pediatrics (0 to 5 Years) and At-Risk Patients (6 to 64 Years) (1 - PCV) Never done     NICOTINE/TOBACCO CESSATION COUNSELING Q 1 YR  06/03/2017     YEARLY PREVENTIVE VISIT  06/03/2017     PAP  06/03/2019     INFLUENZA VACCINE (1) 09/01/2022                "

## 2023-03-03 ENCOUNTER — LAB REQUISITION (OUTPATIENT)
Dept: LAB | Facility: CLINIC | Age: 33
End: 2023-03-03
Payer: COMMERCIAL

## 2023-03-03 PROCEDURE — 87077 CULTURE AEROBIC IDENTIFY: CPT | Mod: ORL | Performed by: PHYSICIAN ASSISTANT

## 2023-03-03 PROCEDURE — 87075 CULTR BACTERIA EXCEPT BLOOD: CPT | Mod: ORL | Performed by: PHYSICIAN ASSISTANT

## 2023-03-04 ENCOUNTER — HOSPITAL ENCOUNTER (EMERGENCY)
Facility: CLINIC | Age: 33
Discharge: HOME | End: 2023-03-04
Attending: EMERGENCY MEDICINE | Admitting: EMERGENCY MEDICINE
Payer: COMMERCIAL

## 2023-03-04 VITALS
SYSTOLIC BLOOD PRESSURE: 143 MMHG | WEIGHT: 126 LBS | HEART RATE: 91 BPM | BODY MASS INDEX: 19.73 KG/M2 | RESPIRATION RATE: 18 BRPM | DIASTOLIC BLOOD PRESSURE: 93 MMHG | OXYGEN SATURATION: 96 % | TEMPERATURE: 98.4 F

## 2023-03-04 DIAGNOSIS — T81.40XA POSTOPERATIVE INFECTION, UNSPECIFIED TYPE, INITIAL ENCOUNTER: ICD-10-CM

## 2023-03-04 PROCEDURE — 99283 EMERGENCY DEPT VISIT LOW MDM: CPT | Performed by: EMERGENCY MEDICINE

## 2023-03-04 RX ORDER — OXYCODONE HYDROCHLORIDE 5 MG/1
5 TABLET ORAL EVERY 6 HOURS PRN
Qty: 10 TABLET | Refills: 0 | Status: SHIPPED | OUTPATIENT
Start: 2023-03-04 | End: 2023-08-06

## 2023-03-04 NOTE — ED PROVIDER NOTES
History     Chief Complaint   Patient presents with     Wound Infection     HPI  Shruthi Vega is a 33 year old female who presents with left third finger pain and swelling.  She underwent flexor tendon repair at [facility] about 10 days ago per her report, she was seen there yesterday and diagnosed with an infection and a couple of sutures were removed.  She is scheduled to have repeat surgery in 2 days, morning of 3/6.  Currently on clindamycin taking as prescribed.  No current pain meds.  Denies fever.  She is not immunocompromise or diabetic.    Allergies:  Allergies   Allergen Reactions     Doxycycline Other (See Comments)     Flu-like symptoms       Problem List:    Patient Active Problem List    Diagnosis Date Noted     Methamphetamine dependence in remission (H) 02/21/2023     Priority: Medium     Anxiety 09/23/2016     Priority: Medium     Alcohol abuse 09/23/2016     Priority: Medium     Herpes simplex type 1 infection 03/16/2016     Priority: Medium     Major depressive disorder, single episode, mild (H) 02/29/2016     Priority: Medium     Female infertility 03/02/2015     Priority: Medium     Infertility tests:  Baseline Labs-normal, including normal antimullerian hormone  Tubal Factors-HSG-normal  Male Factor-  Ovulation- luteal prog=7.6    Plan- offered Clomid ovulation enhancement       History of PID 03/02/2015     Priority: Medium     Recurrent vaginitis 04/04/2014     Priority: Medium     Alternates between yeast and BV  trimosan         Mittelschmerz 04/04/2014     Priority: Medium     CARDIOVASCULAR SCREENING; LDL GOAL LESS THAN 160 03/06/2013     Priority: Medium        Past Medical History:    Past Medical History:   Diagnosis Date     Arthritis      Depressive disorder      GERD (gastroesophageal reflux disease)      Hypertension        Past Surgical History:    Past Surgical History:   Procedure Laterality Date     COLONOSCOPY       MOUTH SURGERY      wisdom teeth       Family  History:    Family History   Problem Relation Age of Onset     Hypertension Father      Cerebrovascular Disease Maternal Grandmother      Asthma Maternal Half-Sister      Thyroid Disease Maternal Half-Sister        Social History:  Marital Status:  Single [1]  Social History     Tobacco Use     Smoking status: Every Day     Packs/day: 4.00     Years: 10.00     Pack years: 40.00     Types: Cigarettes     Start date: 2010     Last attempt to quit: 2021     Years since quittin.3     Smokeless tobacco: Never   Vaping Use     Vaping Use: Never used   Substance Use Topics     Alcohol use: Not Currently     Drug use: Not Currently     Types: Methamphetamines        Medications:    oxyCODONE (ROXICODONE) 5 MG tablet          Review of Systems  Problem focused review of systems otherwise negative    Physical Exam   BP: (!) 143/93  Pulse: 91  Temp: 98.4  F (36.9  C)  Resp: 18  Weight: 57.2 kg (126 lb)  SpO2: 96 %      Physical Exam  Left third finger shows incision along the volar aspect as outlined in the picture below, there is some mild swelling, there is no tenderness to palpation of the flexor tendon along the palm, there is no swelling of the palmar hand, sensation is intact, there is no significant erythema or warmth, no purulent discharge.  Limited range of motion secondary to pain and swelling.  ED Course                       No results found for this or any previous visit (from the past 24 hour(s)).    Medications - No data to display    Assessments & Plan (with Medical Decision Making)  Left third finger pain postop 10 days, postoperative infection, scheduled for repeat OR morning of 3/6.  Currently on clindamycin.  Certainly no clinical evidence for progressive infection on evaluation today.  She is not immunocompromised.  Recommend oxycodone, ibuprofen acetaminophen elevate  Continue clindamycin  Return here for progressive redness pain swelling or other concern, follow-up for surgery on Monday as  scheduled     I have reviewed the nursing notes.    I have reviewed the findings, diagnosis, plan and need for follow up with the patient.        New Prescriptions    OXYCODONE (ROXICODONE) 5 MG TABLET    Take 1 tablet (5 mg) by mouth every 6 hours as needed for severe pain (7-10)       Final diagnoses:   Postoperative infection, unspecified type, initial encounter - Left third finger post flexor tendon repair       3/4/2023   [facility] EMERGENCY DEPT     Alok Schaefer MD  03/04/23 [id number]

## 2023-03-04 NOTE — DISCHARGE INSTRUCTIONS
Tylenol ibuprofen oxycodone    Continue clindamycin    Follow-up hand surgery Monday as scheduled    Return here for progressive pain, redness, purulent discharge, fever or any other concern

## 2023-03-04 NOTE — ED TRIAGE NOTES
"Patient had surgery approximately 1 1/2 weeks ago on left middle finger due to tendon damage from laceration.  Patient started taking antibiotics yesterday and is scheduled for surgery again on Monday.  Patient concerned that the \"infection\" isn't getting any better.     Triage Assessment     Row Name 03/04/23 7235       Triage Assessment (Adult)    Airway WDL WDL       Respiratory WDL    Respiratory WDL WDL       Skin Circulation/Temperature WDL    Skin Circulation/Temperature WDL WDL       Cardiac WDL    Cardiac WDL WDL       Peripheral/Neurovascular WDL    Peripheral Neurovascular WDL WDL       Cognitive/Neuro/Behavioral WDL    Cognitive/Neuro/Behavioral WDL WDL              "

## 2023-03-05 ENCOUNTER — HOSPITAL ENCOUNTER (EMERGENCY)
Facility: CLINIC | Age: 33
Discharge: HOME | End: 2023-03-05
Attending: EMERGENCY MEDICINE | Admitting: EMERGENCY MEDICINE
Payer: COMMERCIAL

## 2023-03-05 VITALS
RESPIRATION RATE: 22 BRPM | BODY MASS INDEX: 20.4 KG/M2 | OXYGEN SATURATION: 100 % | HEIGHT: 67 IN | DIASTOLIC BLOOD PRESSURE: 91 MMHG | WEIGHT: 130 LBS | SYSTOLIC BLOOD PRESSURE: 134 MMHG | TEMPERATURE: 98.4 F | HEART RATE: 88 BPM

## 2023-03-05 DIAGNOSIS — T81.40XA POSTOPERATIVE INFECTION, UNSPECIFIED TYPE, INITIAL ENCOUNTER: ICD-10-CM

## 2023-03-05 LAB
ANION GAP SERPL CALCULATED.3IONS-SCNC: 10 MMOL/L (ref 7–15)
BACTERIA WND CULT: ABNORMAL
BASOPHILS # BLD AUTO: 0.1 10E3/UL (ref 0–0.2)
BASOPHILS NFR BLD AUTO: 1 %
BUN SERPL-MCNC: 12.5 MG/DL (ref 6–20)
CALCIUM SERPL-MCNC: 8.9 MG/DL (ref 8.6–10)
CHLORIDE SERPL-SCNC: 105 MMOL/L (ref 98–107)
CREAT SERPL-MCNC: 0.64 MG/DL (ref 0.51–0.95)
CRP SERPL-MCNC: <3 MG/L
DEPRECATED HCO3 PLAS-SCNC: 24 MMOL/L (ref 22–29)
EOSINOPHIL # BLD AUTO: 0.2 10E3/UL (ref 0–0.7)
EOSINOPHIL NFR BLD AUTO: 3 %
ERYTHROCYTE [DISTWIDTH] IN BLOOD BY AUTOMATED COUNT: 12.1 % (ref 10–15)
GFR SERPL CREATININE-BSD FRML MDRD: >90 ML/MIN/1.73M2
GLUCOSE SERPL-MCNC: 113 MG/DL (ref 70–99)
HCT VFR BLD AUTO: 45.3 % (ref 35–47)
HGB BLD-MCNC: 15 G/DL (ref 11.7–15.7)
IMM GRANULOCYTES # BLD: 0 10E3/UL
IMM GRANULOCYTES NFR BLD: 0 %
LYMPHOCYTES # BLD AUTO: 1.9 10E3/UL (ref 0.8–5.3)
LYMPHOCYTES NFR BLD AUTO: 22 %
MCH RBC QN AUTO: 30.4 PG (ref 26.5–33)
MCHC RBC AUTO-ENTMCNC: 33.1 G/DL (ref 31.5–36.5)
MCV RBC AUTO: 92 FL (ref 78–100)
MONOCYTES # BLD AUTO: 0.8 10E3/UL (ref 0–1.3)
MONOCYTES NFR BLD AUTO: 9 %
NEUTROPHILS # BLD AUTO: 5.8 10E3/UL (ref 1.6–8.3)
NEUTROPHILS NFR BLD AUTO: 65 %
NRBC # BLD AUTO: 0 10E3/UL
NRBC BLD AUTO-RTO: 0 /100
PLATELET # BLD AUTO: 417 10E3/UL (ref 150–450)
POTASSIUM SERPL-SCNC: 4 MMOL/L (ref 3.4–5.3)
PROCALCITONIN SERPL IA-MCNC: 0.04 NG/ML
RBC # BLD AUTO: 4.94 10E6/UL (ref 3.8–5.2)
SODIUM SERPL-SCNC: 139 MMOL/L (ref 136–145)
WBC # BLD AUTO: 8.8 10E3/UL (ref 4–11)

## 2023-03-05 PROCEDURE — 96365 THER/PROPH/DIAG IV INF INIT: CPT | Performed by: EMERGENCY MEDICINE

## 2023-03-05 PROCEDURE — 84145 PROCALCITONIN (PCT): CPT | Performed by: EMERGENCY MEDICINE

## 2023-03-05 PROCEDURE — 99285 EMERGENCY DEPT VISIT HI MDM: CPT | Mod: 25 | Performed by: EMERGENCY MEDICINE

## 2023-03-05 PROCEDURE — 86140 C-REACTIVE PROTEIN: CPT | Performed by: EMERGENCY MEDICINE

## 2023-03-05 PROCEDURE — 36415 COLL VENOUS BLD VENIPUNCTURE: CPT | Performed by: EMERGENCY MEDICINE

## 2023-03-05 PROCEDURE — 96375 TX/PRO/DX INJ NEW DRUG ADDON: CPT | Performed by: EMERGENCY MEDICINE

## 2023-03-05 PROCEDURE — 80048 BASIC METABOLIC PNL TOTAL CA: CPT | Performed by: EMERGENCY MEDICINE

## 2023-03-05 PROCEDURE — 99284 EMERGENCY DEPT VISIT MOD MDM: CPT | Performed by: EMERGENCY MEDICINE

## 2023-03-05 PROCEDURE — 250N000011 HC RX IP 250 OP 636: Performed by: EMERGENCY MEDICINE

## 2023-03-05 PROCEDURE — 96367 TX/PROPH/DG ADDL SEQ IV INF: CPT | Performed by: EMERGENCY MEDICINE

## 2023-03-05 PROCEDURE — 85004 AUTOMATED DIFF WBC COUNT: CPT | Performed by: EMERGENCY MEDICINE

## 2023-03-05 RX ORDER — VANCOMYCIN HYDROCHLORIDE 1 G/200ML
1000 INJECTION, SOLUTION INTRAVENOUS ONCE
Status: COMPLETED | OUTPATIENT
Start: 2023-03-05 | End: 2023-03-05

## 2023-03-05 RX ORDER — VANCOMYCIN HYDROCHLORIDE 1 G/200ML
1000 INJECTION, SOLUTION INTRAVENOUS EVERY 12 HOURS
Status: DISCONTINUED | OUTPATIENT
Start: 2023-03-06 | End: 2023-03-05 | Stop reason: HOSPADM

## 2023-03-05 RX ORDER — HYDROMORPHONE HYDROCHLORIDE 1 MG/ML
0.5 INJECTION, SOLUTION INTRAMUSCULAR; INTRAVENOUS; SUBCUTANEOUS ONCE
Status: COMPLETED | OUTPATIENT
Start: 2023-03-05 | End: 2023-03-05

## 2023-03-05 RX ORDER — CEFTRIAXONE 1 G/1
1 INJECTION, POWDER, FOR SOLUTION INTRAMUSCULAR; INTRAVENOUS ONCE
Status: COMPLETED | OUTPATIENT
Start: 2023-03-05 | End: 2023-03-05

## 2023-03-05 RX ADMIN — VANCOMYCIN HYDROCHLORIDE 1000 MG: 1 INJECTION, SOLUTION INTRAVENOUS at 14:47

## 2023-03-05 RX ADMIN — CEFTRIAXONE SODIUM 1 G: 1 INJECTION, POWDER, FOR SOLUTION INTRAMUSCULAR; INTRAVENOUS at 14:06

## 2023-03-05 RX ADMIN — HYDROMORPHONE HYDROCHLORIDE 0.5 MG: 1 INJECTION, SOLUTION INTRAMUSCULAR; INTRAVENOUS; SUBCUTANEOUS at 14:05

## 2023-03-05 ASSESSMENT — ACTIVITIES OF DAILY LIVING (ADL): ADLS_ACUITY_SCORE: 35

## 2023-03-05 NOTE — ED PROVIDER NOTES
History     Chief Complaint   Patient presents with     Wound Infection     States her laceration is getting worse. Patient arrives to the triage room in tears.      HPI  Shruthi Vega is a 33 year old female who presents with left third finger pain.  I saw her yesterday, she has infection after flexor tendon repair by [facility] proximately 11 days out.  She is currently on clindamycin, and wound culture from 3/3 grew MRSA.  At time evaluation yesterday, finger was swollen not red, it was without purulent discharge it is pictured in the note from yesterday.  I prescribed her oxycodone for pain but she tells me she is unable to get that filled and comes in because of severe pain.  On exam yesterday she had no significant palmar tenderness.  She denies any fever, she does not have immunocompromise.  She is not diabetic.  There is history of methamphetamine dependence in the past as well as alcohol abuse.    Allergies:  Allergies   Allergen Reactions     Doxycycline Other (See Comments)     Flu-like symptoms       Problem List:    Patient Active Problem List    Diagnosis Date Noted     Methamphetamine dependence in remission (H) 02/21/2023     Priority: Medium     Anxiety 09/23/2016     Priority: Medium     Alcohol abuse 09/23/2016     Priority: Medium     Herpes simplex type 1 infection 03/16/2016     Priority: Medium     Major depressive disorder, single episode, mild (H) 02/29/2016     Priority: Medium     Female infertility 03/02/2015     Priority: Medium     Infertility tests:  Baseline Labs-normal, including normal antimullerian hormone  Tubal Factors-HSG-normal  Male Factor-  Ovulation- luteal prog=7.6    Plan- offered Clomid ovulation enhancement       History of PID 03/02/2015     Priority: Medium     Recurrent vaginitis 04/04/2014     Priority: Medium     Alternates between yeast and BV  trimosan         Mittelschmerz 04/04/2014     Priority: Medium     CARDIOVASCULAR SCREENING; LDL GOAL LESS  "THAN 160 2013     Priority: Medium        Past Medical History:    Past Medical History:   Diagnosis Date     Arthritis      Depressive disorder      GERD (gastroesophageal reflux disease)      Hypertension        Past Surgical History:    Past Surgical History:   Procedure Laterality Date     COLONOSCOPY       MOUTH SURGERY      wisdom teeth       Family History:    Family History   Problem Relation Age of Onset     Hypertension Father      Cerebrovascular Disease Maternal Grandmother      Asthma Maternal Half-Sister      Thyroid Disease Maternal Half-Sister        Social History:  Marital Status:  Single [1]  Social History     Tobacco Use     Smoking status: Every Day     Packs/day: 4.00     Years: 10.00     Pack years: 40.00     Types: Cigarettes     Start date: 2010     Last attempt to quit: 2021     Years since quittin.3     Smokeless tobacco: Never   Vaping Use     Vaping Use: Never used   Substance Use Topics     Alcohol use: Not Currently     Drug use: Not Currently     Types: Methamphetamines        Medications:    oxyCODONE (ROXICODONE) 5 MG tablet          Review of Systems  Problem focused review of systems otherwise negative    Physical Exam   BP: (!) 134/91  Pulse: 88  Temp: 98.4  F (36.9  C)  Resp: 22  Height: 170.2 cm (5' 7\")  Weight: 59 kg (130 lb)  SpO2: 100 %      Physical Exam  Nontoxic-appearing no respiratory distress tearful  Examination left hand shows moderate swelling of the third digit, the wound is dehiscence along the radial distal aspect which is the same as yesterday, there is no significant redness or warmth, no purulent discharge, sensation is intact, there is scant palmar swelling over the third MCP with some mild associated tenderness, the palm however otherwise remains without swelling redness or induration.  ED Course                     Results for orders placed or performed during the hospital encounter of 23 (from the past 24 hour(s))   CBC with " platelets differential    Narrative    The following orders were created for panel order CBC with platelets differential.  Procedure                               Abnormality         Status                     ---------                               -----------         ------                     CBC with platelets and d...[[id number]]                      Final result                 Please view results for these tests on the individual orders.   Basic metabolic panel   Result Value Ref Range    Sodium 139 136 - 145 mmol/L    Potassium 4.0 3.4 - 5.3 mmol/L    Chloride 105 98 - 107 mmol/L    Carbon Dioxide (CO2) 24 22 - 29 mmol/L    Anion Gap 10 7 - 15 mmol/L    Urea Nitrogen 12.5 6.0 - 20.0 mg/dL    Creatinine 0.64 0.51 - 0.95 mg/dL    Calcium 8.9 8.6 - 10.0 mg/dL    Glucose 113 (H) 70 - 99 mg/dL    GFR Estimate >90 >60 mL/min/1.73m2   CRP inflammation   Result Value Ref Range    CRP Inflammation <3.00 <5.00 mg/L   CBC with platelets and differential   Result Value Ref Range    WBC Count 8.8 4.0 - 11.0 10e3/uL    RBC Count 4.94 3.80 - 5.20 10e6/uL    Hemoglobin 15.0 11.7 - 15.7 g/dL    Hematocrit 45.3 35.0 - 47.0 %    MCV 92 78 - 100 fL    MCH 30.4 26.5 - 33.0 pg    MCHC 33.1 31.5 - 36.5 g/dL    RDW 12.1 10.0 - 15.0 %    Platelet Count 417 150 - 450 10e3/uL    % Neutrophils 65 %    % Lymphocytes 22 %    % Monocytes 9 %    % Eosinophils 3 %    % Basophils 1 %    % Immature Granulocytes 0 %    NRBCs per 100 WBC 0 <1 /100    Absolute Neutrophils 5.8 1.6 - 8.3 10e3/uL    Absolute Lymphocytes 1.9 0.8 - 5.3 10e3/uL    Absolute Monocytes 0.8 0.0 - 1.3 10e3/uL    Absolute Eosinophils 0.2 0.0 - 0.7 10e3/uL    Absolute Basophils 0.1 0.0 - 0.2 10e3/uL    Absolute Immature Granulocytes 0.0 <=0.4 10e3/uL    Absolute NRBCs 0.0 10e3/uL   Procalcitonin   Result Value Ref Range    Procalcitonin 0.04 <0.05 ng/mL       Medications   vancomycin (VANCOCIN) 1000 mg in dextrose 5% 200 mL PREMIX (1,000 mg Intravenous $New Bag 3/5/23 7547)    vancomycin (VANCOCIN) 1000 mg in dextrose 5% 200 mL PREMIX (has no administration in time range)   HYDROmorphone (PF) (DILAUDID) injection 0.5 mg (0.5 mg Intravenous $Given 3/5/23 7081)   cefTRIAXone (ROCEPHIN) 1 g vial to attach to  mL bag for ADULTS or NS 50 mL bag for PEDS (0 g Intravenous Stopped 3/5/23 9999)       Assessments & Plan (with Medical Decision Making)  Postoperative left finger infection as detailed above.  No fever, white count, CRP and procalcitonin normal.  No appreciable change with respect to exam from yesterday.  Currently on clindamycin.  Recommend continuing clindamycin, she is scheduled for reevaluation and surgery tomorrow with [facility] per her report.  She is appropriate for discharged home at this time.  She can take the oxycodone prescription from yesterday for pain.     I have reviewed the nursing notes.    I have reviewed the findings, diagnosis, plan and need for follow up with the patient.        New Prescriptions    No medications on file       Final diagnoses:   Postoperative infection, unspecified type, initial encounter       3/5/2023   [facility] EMERGENCY DEPT     Alok Schaefer MD  03/05/23 3966

## 2023-03-05 NOTE — PHARMACY-VANCOMYCIN DOSING SERVICE
Pharmacy Vancomycin Initial Note  Date of Service 2023  Patient's  1990  33 year old, female    Indication: Skin and Soft Tissue Infection (MRSA+ cultures)    Current estimated CrCl = Estimated Creatinine Clearance: 114.3 mL/min (based on SCr of 0.64 mg/dL).    Creatinine for last 3 days  3/5/2023:  2:00 PM Creatinine 0.64 mg/dL    Recent Vancomycin Level(s) for last 3 days  No results found for requested labs within last 72 hours.      Vancomycin IV Administrations (past 72 hours)      No vancomycin orders with administrations in past 72 hours.                Nephrotoxins and other renal medications (From now, onward)    Start     Dose/Rate Route Frequency Ordered Stop    23 1400  vancomycin (VANCOCIN) 1000 mg in dextrose 5% 200 mL PREMIX         1,000 mg  200 mL/hr over 1 Hours Intravenous ONCE 23 1359            Contrast Orders - past 72 hours (72h ago, onward)    None          [employer] Prediction of Planned Initial Vancomycin Regimen  Loading dose: N/A  Regimen: 1000 mg IV every 12 hours.  Start time: 14:37 on 2023  Exposure target: AUC24 (range)400-600 mg/L.hr   AUC24,ss: 524 mg/L.hr  Probability of AUC24 > 400: 77 %  Ctrough,ss: 15.6 mg/L  Probability of Ctrough,ss > 20: 30 %  Probability of nephrotoxicity (Lodise LEANNE ): 11 %          Plan:  1. Start vancomycin  1000 mg IV q12h.   2. Vancomycin monitoring method: AUC  3. Vancomycin therapeutic monitoring goal: 400-600 mg*h/L  4. Pharmacy will check vancomycin levels as appropriate in 1-3 Days.    5. Serum creatinine levels will be ordered daily for the first week of therapy and at least twice weekly for subsequent weeks.      Aamir Amor [facility]

## 2023-03-05 NOTE — ED NOTES
Hand dressed with vaseline gauze, telfa, and padded with 4x4 gauze and ACE wrap and splint pt had provided. Sent with [facility].

## 2023-03-05 NOTE — DISCHARGE INSTRUCTIONS
Continue clindamycin as prescribed    Oxycodone for pain you may take Tylenol and ibuprofen as well    Follow-up with [facility] tomorrow morning as scheduled

## 2023-03-06 PROCEDURE — 87205 SMEAR GRAM STAIN: CPT | Mod: ORL | Performed by: ORTHOPAEDIC SURGERY

## 2023-03-06 PROCEDURE — 87070 CULTURE OTHR SPECIMN AEROBIC: CPT | Mod: ORL | Performed by: ORTHOPAEDIC SURGERY

## 2023-03-07 ENCOUNTER — LAB REQUISITION (OUTPATIENT)
Dept: LAB | Facility: CLINIC | Age: 33
End: 2023-03-07
Payer: COMMERCIAL

## 2023-03-07 LAB
GRAM STAIN RESULT: NORMAL
GRAM STAIN RESULT: NORMAL

## 2023-03-09 LAB — BACTERIA WND CULT: ABNORMAL

## 2023-03-10 LAB — BACTERIA WND CULT: ABNORMAL

## 2023-03-26 ENCOUNTER — HEALTH MAINTENANCE LETTER (OUTPATIENT)
Age: 33
End: 2023-03-26

## 2023-06-06 ENCOUNTER — PATIENT OUTREACH (OUTPATIENT)
Dept: CARE COORDINATION | Facility: CLINIC | Age: 33
End: 2023-06-06
Payer: COMMERCIAL

## 2023-06-06 NOTE — PROGRESS NOTES
Clinical Product Navigator RN reviewed chart; patient on payer product coverage.  Review results:   CPN Initial Information Gathering  Referral Source: Pro-Active Outreach    Patient identified on internal report, At risk, not enrolled in care coordination, no PCP.  Patient has had 4 ED visits in the past year.  Patient has overdue health maintenance Care Gaps.    Patient Active Problem List   Diagnosis     CARDIOVASCULAR SCREENING; LDL GOAL LESS THAN 160     Recurrent vaginitis     Mittelschmerz     Female infertility     History of PID     Major depressive disorder, single episode, mild (H)     Herpes simplex type 1 infection     Anxiety     Alcohol abuse     Methamphetamine dependence in remission (H)      Clinic Care Coordination Contact  [facility]/Voicemail    Referral Source: [employer] Outreach  Clinical Data: Care Coordinator Outreach  Outreach attempted x 1.  Left message on patient's voicemail with call back information and requested return call.  Plan: Care Coordinator will try to reach patient again in 3-5 business days.    Melissa Behl BSN, RN, PHN, CCM  RN Clinical Product Navigator  [phone]

## 2023-06-09 NOTE — PROGRESS NOTES
Clinic Care Coordination Contact  [facility]/Voicemail    Referral Source: Pro-Active Outreach  Clinical Data: Care Coordinator Outreach  Outreach attempted x 2.  Left message on patient's voicemail with call back information and requested return call.  Plan: Care Coordinator will send unable to contact letter with care coordinator contact information via [employer]. Care Coordinator will do no further outreaches at this time.    Melissa Behl BSN, RN, PHN, [facility]  RN Clinical Product Navigator  [phone]

## 2023-08-06 ENCOUNTER — HOSPITAL ENCOUNTER (EMERGENCY)
Facility: CLINIC | Age: 33
Discharge: HOME | End: 2023-08-06
Attending: FAMILY MEDICINE | Admitting: FAMILY MEDICINE
Payer: COMMERCIAL

## 2023-08-06 VITALS
BODY MASS INDEX: 19.7 KG/M2 | DIASTOLIC BLOOD PRESSURE: 97 MMHG | OXYGEN SATURATION: 98 % | RESPIRATION RATE: 18 BRPM | SYSTOLIC BLOOD PRESSURE: 140 MMHG | WEIGHT: 130 LBS | TEMPERATURE: 98.1 F | HEART RATE: 72 BPM | HEIGHT: 68 IN
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
## 2023-08-06 PROCEDURE — 99283 EMERGENCY DEPT VISIT LOW MDM: CPT | Performed by: FAMILY MEDICINE

## 2023-08-07 NOTE — DISCHARGE INSTRUCTIONS
Warm pack the area as desired.    You may use some bacitracin to the lower lip wound 3 or 4 times daily.    Take the antibiotic as directed.    If you develop worsening symptoms such as swelling, redness, drainage, return for a recheck.

## 2023-08-07 NOTE — ED PROVIDER NOTES
History     Chief Complaint   Patient presents with    Cat Bite     HPI  Shruthi Vega is a 33 year old female who had a cat bite a few hours ago.      This was the patient's personal cat, 8 years old, immunized.  Patient also has Tdap up-to-date.    Cat bit her lower lip.  There was a puncture wound and a small vertical laceration.  The laceration bled for quite a while but has now stopped.    She has been unemployed for about 4 months following complications of a left middle finger laceration which scarred in in a flexed position.      Allergies:  Allergies   Allergen Reactions    Doxycycline Other (See Comments)     Flu-like symptoms       Problem List:    Patient Active Problem List    Diagnosis Date Noted    Methamphetamine dependence in remission (H) 02/21/2023     Priority: Medium    Anxiety 09/23/2016     Priority: Medium    Alcohol abuse 09/23/2016     Priority: Medium    Herpes simplex type 1 infection 03/16/2016     Priority: Medium    Major depressive disorder, single episode, mild (H) 02/29/2016     Priority: Medium    Female infertility 03/02/2015     Priority: Medium     Infertility tests:  Baseline Labs-normal, including normal antimullerian hormone  Tubal Factors-HSG-normal  Male Factor-  Ovulation- luteal prog=7.6    Plan- offered Clomid ovulation enhancement      History of PID 03/02/2015     Priority: Medium    Recurrent vaginitis 04/04/2014     Priority: Medium     Alternates between yeast and BV  trimosan        Mittelschmerz 04/04/2014     Priority: Medium    CARDIOVASCULAR SCREENING; LDL GOAL LESS THAN 160 03/06/2013     Priority: Medium        Past Medical History:    Past Medical History:   Diagnosis Date    Arthritis     Depressive disorder     GERD (gastroesophageal reflux disease)     Hypertension        Past Surgical History:    Past Surgical History:   Procedure Laterality Date    COLONOSCOPY      MOUTH SURGERY      wisdom teeth       Family History:    Family History   Problem  "Relation Age of Onset    Hypertension Father     Cerebrovascular Disease Maternal Grandmother     Asthma Maternal Half-Sister     Thyroid Disease Maternal Half-Sister        Social History:  Marital Status:  Single [1]  Social History     Tobacco Use    Smoking status: Every Day     Packs/day: 4.00     Years: 10.00     Pack years: 40.00     Types: Cigarettes     Start date: 2010     Last attempt to quit: 2021     Years since quittin.7    Smokeless tobacco: Never   Vaping Use    Vaping Use: Never used   Substance Use Topics    Alcohol use: Not Currently    Drug use: Not Currently     Types: Methamphetamines        Medications:    amoxicillin-clavulanate (AUGMENTIN) 875-125 MG tablet          Review of Systems    Unremarkable except as above.        Physical Exam   BP: (!) 140/97  Pulse: 72  Temp: 98.1  F (36.7  C)  Resp: 18  Height: 171.5 cm (5' 7.5\")  Weight: 59 kg (130 lb)  SpO2: 98 %      Physical Exam    She is a thin woman who appears well in general.  HEENT -a puncture wound left lateral aspect of lower lip is noted as well is a shallow vertical laceration about 4 mm length mid portion of the lower lip just above the vermilion border.  Nonlabored breathing.  Abdomen nondistended.  Left middle finger deformity.  No redness or swelling.        ED Course          Thin woman who basically appears well.         Procedures             Critical Care time:               No results found for this or any previous visit (from the past 24 hour(s)).    Medications - No data to display    Assessments & Plan (with Medical Decision Making)     This patient has 2 lesions on lower lip due to a cat bite.  A small puncture wound and a small shallow laceration are noted.    The laceration is small and does not appear to require suturing.    Patient was advised.  Start antibiotic which was prescribed.  Warm pack.  Bacitracin.  Observe carefully.  Indications to return were also discussed.        I have reviewed the " nursing notes.    I have reviewed the findings, diagnosis, plan and need for follow up with the patient.          Medical Decision Making  The patient's presentation was of low complexity.    The patient's evaluation involved:  History and exam.    The patient's management necessitated   Medication and care advice.  Observation advised.        New Prescriptions    AMOXICILLIN-CLAVULANATE (AUGMENTIN) 875-125 MG TABLET    Take 1 tablet by mouth 2 times daily for 5 days       Final diagnoses:   Cat bite, initial encounter       8/6/2023   [facility] EMERGENCY DEPT       Emir Cagle MD  08/06/23 3227

## 2023-08-07 NOTE — ED TRIAGE NOTES
Cat bite by own cat. Punctures to lower lip and scratch to upper lip. Cat UTD immunizations. Last Tetanus 2/5/2023.

## 2024-04-15 ENCOUNTER — TELEPHONE (OUTPATIENT)
Dept: FAMILY MEDICINE | Facility: CLINIC | Age: 34
End: 2024-04-15
Payer: COMMERCIAL

## 2024-04-15 NOTE — TELEPHONE ENCOUNTER
Patient Quality Outreach    Patient is due for the following:   Cervical Cancer Screening - PAP Needed  Depression  -  PHQ-9 needed  Physical Preventive Adult Physical    Next Steps:   No follow up needed at this time.  Patient does not seek primary care through [facility].    Type of outreach:    Chart review performed, no outreach needed.      Questions for provider review:    None           Bailee Kahler

## 2024-05-06 ENCOUNTER — OFFICE VISIT (OUTPATIENT)
Dept: URGENT CARE | Facility: URGENT CARE | Age: 34
End: 2024-05-06
Payer: COMMERCIAL

## 2024-05-06 VITALS
OXYGEN SATURATION: 100 % | RESPIRATION RATE: 16 BRPM | TEMPERATURE: 98.5 F | DIASTOLIC BLOOD PRESSURE: 87 MMHG | WEIGHT: 134 LBS | HEART RATE: 77 BPM | BODY MASS INDEX: 20.68 KG/M2 | SYSTOLIC BLOOD PRESSURE: 132 MMHG

## 2024-05-06 DIAGNOSIS — H10.33 ACUTE BACTERIAL CONJUNCTIVITIS OF BOTH EYES: Primary | ICD-10-CM

## 2024-05-06 PROCEDURE — 99213 OFFICE O/P EST LOW 20 MIN: CPT | Performed by: NURSE PRACTITIONER

## 2024-05-06 RX ORDER — OFLOXACIN 3 MG/ML
1-2 SOLUTION/ DROPS OPHTHALMIC 4 TIMES DAILY
Qty: 10 ML | Refills: 0 | Status: SHIPPED | OUTPATIENT
Start: 2024-05-06 | End: 2024-05-11

## 2024-05-06 NOTE — PATIENT INSTRUCTIONS
1) Warm compress with a clean wet washcloth to affected eye.  2) If eye begins to have a thick discharge fill eye drop prescription and administer 1 drop to the affected eye four times a day.   3) Practice good hand hygiene. Avoid sharing linens such as pillowcases, towels, or wash clothes. Wash these items on hot to prevent spreading.  4) Patient can attend day care as long as he/she does not have a fever greater than 100.4.   5) Follow up if symptoms worsen or if not getting better within 4 days.

## 2024-05-06 NOTE — PROGRESS NOTES
SUBJECTIVE:  Chief Complaint:   Chief Complaint   Patient presents with    Conjunctivitis     For 2 days, Both eyes are pink and full of pus, benadryl not helping      History of Present Illness:  Shruthi Vega is a 34 year old female who presents complaining of moderate both eyes discharge, mattering, redness, itching for 2 day(s). Started on the left and went to the right.  Onset/timing: sudden.    Associated Signs and Symptoms: sore throat  Treatment measures tried include: flushed with water  and warm packs  Contact wearer : No  Tried Benadryl, and eye drops which did not help.    Past Medical History:   Diagnosis Date    Arthritis     Depressive disorder     GERD (gastroesophageal reflux disease)     Hypertension      No current outpatient medications on file.        OBJECTIVE:  /87   Pulse 77   Temp 98.5  F (36.9  C) (Tympanic)   Resp 16   Wt 60.8 kg (134 lb)   SpO2 100%   BMI 20.68 kg/m    General: no acute distress  Eye exam: right eye abnormal findings: conjunctivitis with erythema, discharge and matting noted, left eye abnormal findings: conjunctivitis with erythema, discharge and matting noted.  Missing eyelashes R>L  Pt reports pulling her eyelashes and eyebrows out due to stress.    ASSESSMENT:  1. Acute bacterial conjunctivitis of both eyes    - ofloxacin (OCUFLOX) 0.3 % ophthalmic solution; Place 1-2 drops into both eyes 4 times daily for 5 days  Dispense: 10 mL; Refill: 0      PLAN:  1) Warm compress with a clean wet washcloth to affected eye.  2) If eye begins to have a thick discharge fill eye drop prescription and administer 1 drop to the affected eye four times a day.   3) Practice good hand hygiene. Avoid sharing linens such as pillowcases, towels, or wash clothes. Wash these items on hot to prevent spreading.  4) Patient can attend day care as long as he/she does not have a fever greater than 100.4.   5) Follow up if symptoms worsen or if not getting better within 4 days.

## 2024-05-06 NOTE — LETTER
May 6, 2024      Shruthi Vega  [address] [address] [address]        To Whom It May Concern:    Shruthi Vega  was seen on 5/6/24.  I expect her condition to improve over the next couple days. Please excuse her absence.      Sincerely,        Helen Silva NP

## 2024-05-26 ENCOUNTER — HEALTH MAINTENANCE LETTER (OUTPATIENT)
Age: 34
End: 2024-05-26

## 2024-10-24 ASSESSMENT — PATIENT HEALTH QUESTIONNAIRE - PHQ9: SUM OF ALL RESPONSES TO PHQ QUESTIONS 1-9: 24

## 2024-11-26 ASSESSMENT — PATIENT HEALTH QUESTIONNAIRE - PHQ9: SUM OF ALL RESPONSES TO PHQ QUESTIONS 1-9: 27

## 2025-06-14 ENCOUNTER — HEALTH MAINTENANCE LETTER (OUTPATIENT)
Age: 35
End: 2025-06-14